# Patient Record
Sex: MALE | Race: ASIAN | NOT HISPANIC OR LATINO | URBAN - METROPOLITAN AREA
[De-identification: names, ages, dates, MRNs, and addresses within clinical notes are randomized per-mention and may not be internally consistent; named-entity substitution may affect disease eponyms.]

---

## 2018-03-19 ENCOUNTER — EMERGENCY (EMERGENCY)
Facility: HOSPITAL | Age: 48
LOS: 1 days | Discharge: HOME | End: 2018-03-19
Attending: EMERGENCY MEDICINE | Admitting: EMERGENCY MEDICINE
Payer: COMMERCIAL

## 2018-03-19 VITALS
OXYGEN SATURATION: 97 % | SYSTOLIC BLOOD PRESSURE: 125 MMHG | HEART RATE: 85 BPM | TEMPERATURE: 97 F | RESPIRATION RATE: 16 BRPM | DIASTOLIC BLOOD PRESSURE: 81 MMHG

## 2018-03-19 VITALS
WEIGHT: 225.09 LBS | HEIGHT: 69 IN | HEART RATE: 85 BPM | TEMPERATURE: 98 F | OXYGEN SATURATION: 96 % | DIASTOLIC BLOOD PRESSURE: 100 MMHG | SYSTOLIC BLOOD PRESSURE: 147 MMHG | RESPIRATION RATE: 18 BRPM

## 2018-03-19 DIAGNOSIS — Z98.890 OTHER SPECIFIED POSTPROCEDURAL STATES: Chronic | ICD-10-CM

## 2018-03-19 LAB
ALBUMIN SERPL ELPH-MCNC: 3.7 G/DL — SIGNIFICANT CHANGE UP (ref 3.3–5)
ALP SERPL-CCNC: 58 U/L — SIGNIFICANT CHANGE UP (ref 30–120)
ALT FLD-CCNC: 60 U/L DA — SIGNIFICANT CHANGE UP (ref 10–60)
ANION GAP SERPL CALC-SCNC: 10 MMOL/L — SIGNIFICANT CHANGE UP (ref 5–17)
APPEARANCE UR: CLEAR — SIGNIFICANT CHANGE UP
APTT BLD: 28.9 SEC — SIGNIFICANT CHANGE UP (ref 27.5–37.4)
AST SERPL-CCNC: 31 U/L — SIGNIFICANT CHANGE UP (ref 10–40)
BASOPHILS # BLD AUTO: 0.1 K/UL — SIGNIFICANT CHANGE UP (ref 0–0.2)
BASOPHILS NFR BLD AUTO: 1.4 % — SIGNIFICANT CHANGE UP (ref 0–2)
BILIRUB SERPL-MCNC: 0.3 MG/DL — SIGNIFICANT CHANGE UP (ref 0.2–1.2)
BILIRUB UR-MCNC: NEGATIVE — SIGNIFICANT CHANGE UP
BUN SERPL-MCNC: 15 MG/DL — SIGNIFICANT CHANGE UP (ref 7–23)
CALCIUM SERPL-MCNC: 9.2 MG/DL — SIGNIFICANT CHANGE UP (ref 8.4–10.5)
CHLORIDE SERPL-SCNC: 105 MMOL/L — SIGNIFICANT CHANGE UP (ref 96–108)
CO2 SERPL-SCNC: 27 MMOL/L — SIGNIFICANT CHANGE UP (ref 22–31)
COLOR SPEC: YELLOW — SIGNIFICANT CHANGE UP
CREAT SERPL-MCNC: 0.99 MG/DL — SIGNIFICANT CHANGE UP (ref 0.5–1.3)
DIFF PNL FLD: NEGATIVE — SIGNIFICANT CHANGE UP
EOSINOPHIL # BLD AUTO: 0.2 K/UL — SIGNIFICANT CHANGE UP (ref 0–0.5)
EOSINOPHIL NFR BLD AUTO: 2.3 % — SIGNIFICANT CHANGE UP (ref 0–6)
GLUCOSE SERPL-MCNC: 102 MG/DL — HIGH (ref 70–99)
GLUCOSE UR QL: NEGATIVE MG/DL — SIGNIFICANT CHANGE UP
HCT VFR BLD CALC: 45.3 % — SIGNIFICANT CHANGE UP (ref 39–50)
HGB BLD-MCNC: 14.7 G/DL — SIGNIFICANT CHANGE UP (ref 13–17)
INR BLD: 1.08 RATIO — SIGNIFICANT CHANGE UP (ref 0.88–1.16)
KETONES UR-MCNC: NEGATIVE — SIGNIFICANT CHANGE UP
LEUKOCYTE ESTERASE UR-ACNC: NEGATIVE — SIGNIFICANT CHANGE UP
LYMPHOCYTES # BLD AUTO: 2.9 K/UL — SIGNIFICANT CHANGE UP (ref 1–3.3)
LYMPHOCYTES # BLD AUTO: 29.7 % — SIGNIFICANT CHANGE UP (ref 13–44)
MCHC RBC-ENTMCNC: 27.9 PG — SIGNIFICANT CHANGE UP (ref 27–34)
MCHC RBC-ENTMCNC: 32.4 GM/DL — SIGNIFICANT CHANGE UP (ref 32–36)
MCV RBC AUTO: 86 FL — SIGNIFICANT CHANGE UP (ref 80–100)
MONOCYTES # BLD AUTO: 1 K/UL — HIGH (ref 0–0.9)
MONOCYTES NFR BLD AUTO: 10.6 % — SIGNIFICANT CHANGE UP (ref 2–14)
NEUTROPHILS # BLD AUTO: 5.4 K/UL — SIGNIFICANT CHANGE UP (ref 1.8–7.4)
NEUTROPHILS NFR BLD AUTO: 56 % — SIGNIFICANT CHANGE UP (ref 43–77)
NITRITE UR-MCNC: NEGATIVE — SIGNIFICANT CHANGE UP
PH UR: 6 — SIGNIFICANT CHANGE UP (ref 5–8)
PLATELET # BLD AUTO: 267 K/UL — SIGNIFICANT CHANGE UP (ref 150–400)
POTASSIUM SERPL-MCNC: 4.1 MMOL/L — SIGNIFICANT CHANGE UP (ref 3.5–5.3)
POTASSIUM SERPL-SCNC: 4.1 MMOL/L — SIGNIFICANT CHANGE UP (ref 3.5–5.3)
PROT SERPL-MCNC: 7.4 G/DL — SIGNIFICANT CHANGE UP (ref 6–8.3)
PROT UR-MCNC: NEGATIVE MG/DL — SIGNIFICANT CHANGE UP
PROTHROM AB SERPL-ACNC: 11.8 SEC — SIGNIFICANT CHANGE UP (ref 9.8–12.7)
RBC # BLD: 5.27 M/UL — SIGNIFICANT CHANGE UP (ref 4.2–5.8)
RBC # FLD: 12.8 % — SIGNIFICANT CHANGE UP (ref 10.3–14.5)
SODIUM SERPL-SCNC: 142 MMOL/L — SIGNIFICANT CHANGE UP (ref 135–145)
SP GR SPEC: 1.02 — SIGNIFICANT CHANGE UP (ref 1.01–1.02)
UROBILINOGEN FLD QL: NEGATIVE MG/DL — SIGNIFICANT CHANGE UP
WBC # BLD: 9.6 K/UL — SIGNIFICANT CHANGE UP (ref 3.8–10.5)
WBC # FLD AUTO: 9.6 K/UL — SIGNIFICANT CHANGE UP (ref 3.8–10.5)

## 2018-03-19 PROCEDURE — 81003 URINALYSIS AUTO W/O SCOPE: CPT

## 2018-03-19 PROCEDURE — 74176 CT ABD & PELVIS W/O CONTRAST: CPT | Mod: 26

## 2018-03-19 PROCEDURE — 80053 COMPREHEN METABOLIC PANEL: CPT

## 2018-03-19 PROCEDURE — 99284 EMERGENCY DEPT VISIT MOD MDM: CPT | Mod: 25

## 2018-03-19 PROCEDURE — 74176 CT ABD & PELVIS W/O CONTRAST: CPT

## 2018-03-19 PROCEDURE — 85610 PROTHROMBIN TIME: CPT

## 2018-03-19 PROCEDURE — 85730 THROMBOPLASTIN TIME PARTIAL: CPT

## 2018-03-19 PROCEDURE — 99284 EMERGENCY DEPT VISIT MOD MDM: CPT

## 2018-03-19 PROCEDURE — 85027 COMPLETE CBC AUTOMATED: CPT

## 2018-03-19 PROCEDURE — 96374 THER/PROPH/DIAG INJ IV PUSH: CPT

## 2018-03-19 PROCEDURE — 36415 COLL VENOUS BLD VENIPUNCTURE: CPT

## 2018-03-19 RX ORDER — KETOROLAC TROMETHAMINE 30 MG/ML
30 SYRINGE (ML) INJECTION ONCE
Qty: 0 | Refills: 0 | Status: DISCONTINUED | OUTPATIENT
Start: 2018-03-19 | End: 2018-03-19

## 2018-03-19 RX ORDER — SODIUM CHLORIDE 9 MG/ML
1000 INJECTION INTRAMUSCULAR; INTRAVENOUS; SUBCUTANEOUS ONCE
Qty: 0 | Refills: 0 | Status: COMPLETED | OUTPATIENT
Start: 2018-03-19 | End: 2018-03-19

## 2018-03-19 RX ORDER — TAMSULOSIN HYDROCHLORIDE 0.4 MG/1
0.4 CAPSULE ORAL ONCE
Qty: 0 | Refills: 0 | Status: COMPLETED | OUTPATIENT
Start: 2018-03-19 | End: 2018-03-19

## 2018-03-19 RX ADMIN — Medication 30 MILLIGRAM(S): at 04:34

## 2018-03-19 RX ADMIN — Medication 30 MILLIGRAM(S): at 04:04

## 2018-03-19 RX ADMIN — TAMSULOSIN HYDROCHLORIDE 0.4 MILLIGRAM(S): 0.4 CAPSULE ORAL at 04:34

## 2018-03-19 RX ADMIN — SODIUM CHLORIDE 1000 MILLILITER(S): 9 INJECTION INTRAMUSCULAR; INTRAVENOUS; SUBCUTANEOUS at 04:04

## 2018-03-19 NOTE — ED PROVIDER NOTE - OBJECTIVE STATEMENT
Patient c/o right back/flank pain. Started as a discomfort yesterday, became sever last evening. No n/v/d/c. No fever. Has been urinating frequently with only small amounts passing. Has a history of lithotripsy, but did not have pain with that stone.

## 2018-03-19 NOTE — ED ADULT NURSE NOTE - OBJECTIVE STATEMENT
pt. presents with acute onset rt posterior flank pain and dysuria, since approx. 2 hrs. pta at ed, hx of same in past with dx of renal calculi, denies other complaint at this time

## 2018-03-19 NOTE — ED ADULT NURSE NOTE - CHPI ED SYMPTOMS NEG
no vomiting/no dizziness/no weakness/no nausea/no numbness/no fever/no chills/no decreased eating/drinking

## 2018-03-19 NOTE — ED PROVIDER NOTE - CHPI ED SYMPTOMS NEG
no burning urination/no dysuria/no hematuria/no diarrhea/no vomiting/no blood in stool/no chills/no abdominal distension/no fever

## 2018-08-12 ENCOUNTER — TRANSCRIPTION ENCOUNTER (OUTPATIENT)
Age: 48
End: 2018-08-12

## 2020-01-17 ENCOUNTER — INPATIENT (INPATIENT)
Facility: HOSPITAL | Age: 50
LOS: 2 days | Discharge: HOME | End: 2020-01-20
Attending: INTERNAL MEDICINE | Admitting: INTERNAL MEDICINE
Payer: COMMERCIAL

## 2020-01-17 VITALS
TEMPERATURE: 98 F | SYSTOLIC BLOOD PRESSURE: 146 MMHG | HEART RATE: 88 BPM | OXYGEN SATURATION: 100 % | RESPIRATION RATE: 18 BRPM | DIASTOLIC BLOOD PRESSURE: 83 MMHG

## 2020-01-17 DIAGNOSIS — Z98.890 OTHER SPECIFIED POSTPROCEDURAL STATES: Chronic | ICD-10-CM

## 2020-01-17 NOTE — ED ADULT TRIAGE NOTE - CHIEF COMPLAINT QUOTE
Pt states while driving about 20 minutes ago he suddenly started feeling pain and weakness in L arm followed by chest pain. Pt states said symptoms lasted about 10 minutes and have since resolved.  Pt denies nausea/vomiting/SOB.

## 2020-01-18 DIAGNOSIS — F32.9 MAJOR DEPRESSIVE DISORDER, SINGLE EPISODE, UNSPECIFIED: ICD-10-CM

## 2020-01-18 DIAGNOSIS — I20.9 ANGINA PECTORIS, UNSPECIFIED: ICD-10-CM

## 2020-01-18 DIAGNOSIS — R07.9 CHEST PAIN, UNSPECIFIED: ICD-10-CM

## 2020-01-18 DIAGNOSIS — I10 ESSENTIAL (PRIMARY) HYPERTENSION: ICD-10-CM

## 2020-01-18 PROBLEM — N20.0 CALCULUS OF KIDNEY: Chronic | Status: ACTIVE | Noted: 2018-03-19

## 2020-01-18 LAB
ALBUMIN SERPL ELPH-MCNC: 4.1 G/DL — SIGNIFICANT CHANGE UP (ref 3.3–5)
ALP SERPL-CCNC: 68 U/L — SIGNIFICANT CHANGE UP (ref 40–120)
ALT FLD-CCNC: 69 U/L — HIGH (ref 4–41)
ANION GAP SERPL CALC-SCNC: 11 MMO/L — SIGNIFICANT CHANGE UP (ref 7–14)
APTT BLD: 27.6 SEC — SIGNIFICANT CHANGE UP (ref 27.5–36.3)
AST SERPL-CCNC: 40 U/L — SIGNIFICANT CHANGE UP (ref 4–40)
BASOPHILS # BLD AUTO: 0.06 K/UL — SIGNIFICANT CHANGE UP (ref 0–0.2)
BASOPHILS NFR BLD AUTO: 0.8 % — SIGNIFICANT CHANGE UP (ref 0–2)
BILIRUB SERPL-MCNC: 0.2 MG/DL — SIGNIFICANT CHANGE UP (ref 0.2–1.2)
BUN SERPL-MCNC: 13 MG/DL — SIGNIFICANT CHANGE UP (ref 7–23)
CALCIUM SERPL-MCNC: 9.3 MG/DL — SIGNIFICANT CHANGE UP (ref 8.4–10.5)
CHLORIDE SERPL-SCNC: 106 MMOL/L — SIGNIFICANT CHANGE UP (ref 98–107)
CO2 SERPL-SCNC: 25 MMOL/L — SIGNIFICANT CHANGE UP (ref 22–31)
CREAT SERPL-MCNC: 0.77 MG/DL — SIGNIFICANT CHANGE UP (ref 0.5–1.3)
EOSINOPHIL # BLD AUTO: 0.2 K/UL — SIGNIFICANT CHANGE UP (ref 0–0.5)
EOSINOPHIL NFR BLD AUTO: 2.6 % — SIGNIFICANT CHANGE UP (ref 0–6)
GLUCOSE SERPL-MCNC: 110 MG/DL — HIGH (ref 70–99)
HCT VFR BLD CALC: 46.3 % — SIGNIFICANT CHANGE UP (ref 39–50)
HGB BLD-MCNC: 14.1 G/DL — SIGNIFICANT CHANGE UP (ref 13–17)
IMM GRANULOCYTES NFR BLD AUTO: 0.9 % — SIGNIFICANT CHANGE UP (ref 0–1.5)
INR BLD: 1.18 — HIGH (ref 0.88–1.17)
LIDOCAIN IGE QN: 52.8 U/L — SIGNIFICANT CHANGE UP (ref 7–60)
LYMPHOCYTES # BLD AUTO: 1.93 K/UL — SIGNIFICANT CHANGE UP (ref 1–3.3)
LYMPHOCYTES # BLD AUTO: 24.8 % — SIGNIFICANT CHANGE UP (ref 13–44)
MCHC RBC-ENTMCNC: 27 PG — SIGNIFICANT CHANGE UP (ref 27–34)
MCHC RBC-ENTMCNC: 30.5 % — LOW (ref 32–36)
MCV RBC AUTO: 88.5 FL — SIGNIFICANT CHANGE UP (ref 80–100)
MONOCYTES # BLD AUTO: 0.88 K/UL — SIGNIFICANT CHANGE UP (ref 0–0.9)
MONOCYTES NFR BLD AUTO: 11.3 % — SIGNIFICANT CHANGE UP (ref 2–14)
NEUTROPHILS # BLD AUTO: 4.64 K/UL — SIGNIFICANT CHANGE UP (ref 1.8–7.4)
NEUTROPHILS NFR BLD AUTO: 59.6 % — SIGNIFICANT CHANGE UP (ref 43–77)
NRBC # FLD: 0 K/UL — SIGNIFICANT CHANGE UP (ref 0–0)
PLATELET # BLD AUTO: 266 K/UL — SIGNIFICANT CHANGE UP (ref 150–400)
PMV BLD: 10.9 FL — SIGNIFICANT CHANGE UP (ref 7–13)
POTASSIUM SERPL-MCNC: 3.9 MMOL/L — SIGNIFICANT CHANGE UP (ref 3.5–5.3)
POTASSIUM SERPL-SCNC: 3.9 MMOL/L — SIGNIFICANT CHANGE UP (ref 3.5–5.3)
PROT SERPL-MCNC: 7 G/DL — SIGNIFICANT CHANGE UP (ref 6–8.3)
PROTHROM AB SERPL-ACNC: 13.2 SEC — HIGH (ref 9.8–13.1)
RBC # BLD: 5.23 M/UL — SIGNIFICANT CHANGE UP (ref 4.2–5.8)
RBC # FLD: 14.1 % — SIGNIFICANT CHANGE UP (ref 10.3–14.5)
SODIUM SERPL-SCNC: 142 MMOL/L — SIGNIFICANT CHANGE UP (ref 135–145)
TROPONIN T, HIGH SENSITIVITY: < 6 NG/L — SIGNIFICANT CHANGE UP (ref ?–14)
TROPONIN T, HIGH SENSITIVITY: < 6 NG/L — SIGNIFICANT CHANGE UP (ref ?–14)
WBC # BLD: 7.78 K/UL — SIGNIFICANT CHANGE UP (ref 3.8–10.5)
WBC # FLD AUTO: 7.78 K/UL — SIGNIFICANT CHANGE UP (ref 3.8–10.5)

## 2020-01-18 PROCEDURE — 99223 1ST HOSP IP/OBS HIGH 75: CPT

## 2020-01-18 PROCEDURE — 70551 MRI BRAIN STEM W/O DYE: CPT | Mod: 26

## 2020-01-18 PROCEDURE — 71046 X-RAY EXAM CHEST 2 VIEWS: CPT | Mod: 26

## 2020-01-18 RX ORDER — CLOPIDOGREL BISULFATE 75 MG/1
75 TABLET, FILM COATED ORAL DAILY
Refills: 0 | Status: DISCONTINUED | OUTPATIENT
Start: 2020-01-18 | End: 2020-01-20

## 2020-01-18 RX ORDER — DULOXETINE HYDROCHLORIDE 30 MG/1
30 CAPSULE, DELAYED RELEASE ORAL ONCE
Refills: 0 | Status: COMPLETED | OUTPATIENT
Start: 2020-01-18 | End: 2020-01-18

## 2020-01-18 RX ORDER — ATORVASTATIN CALCIUM 80 MG/1
80 TABLET, FILM COATED ORAL AT BEDTIME
Refills: 0 | Status: DISCONTINUED | OUTPATIENT
Start: 2020-01-18 | End: 2020-01-20

## 2020-01-18 RX ORDER — ACETAMINOPHEN 500 MG
650 TABLET ORAL EVERY 6 HOURS
Refills: 0 | Status: DISCONTINUED | OUTPATIENT
Start: 2020-01-18 | End: 2020-01-20

## 2020-01-18 RX ORDER — AMLODIPINE BESYLATE 2.5 MG/1
1 TABLET ORAL
Qty: 0 | Refills: 0 | DISCHARGE

## 2020-01-18 RX ORDER — ASPIRIN/CALCIUM CARB/MAGNESIUM 324 MG
81 TABLET ORAL DAILY
Refills: 0 | Status: DISCONTINUED | OUTPATIENT
Start: 2020-01-18 | End: 2020-01-20

## 2020-01-18 RX ORDER — ASPIRIN/CALCIUM CARB/MAGNESIUM 324 MG
325 TABLET ORAL ONCE
Refills: 0 | Status: COMPLETED | OUTPATIENT
Start: 2020-01-18 | End: 2020-01-18

## 2020-01-18 RX ORDER — INFLUENZA VIRUS VACCINE 15; 15; 15; 15 UG/.5ML; UG/.5ML; UG/.5ML; UG/.5ML
0.5 SUSPENSION INTRAMUSCULAR ONCE
Refills: 0 | Status: DISCONTINUED | OUTPATIENT
Start: 2020-01-18 | End: 2020-01-20

## 2020-01-18 RX ORDER — AMLODIPINE BESYLATE 2.5 MG/1
10 TABLET ORAL DAILY
Refills: 0 | Status: DISCONTINUED | OUTPATIENT
Start: 2020-01-18 | End: 2020-01-19

## 2020-01-18 RX ADMIN — Medication 650 MILLIGRAM(S): at 16:18

## 2020-01-18 RX ADMIN — Medication 650 MILLIGRAM(S): at 15:48

## 2020-01-18 RX ADMIN — CLOPIDOGREL BISULFATE 75 MILLIGRAM(S): 75 TABLET, FILM COATED ORAL at 22:49

## 2020-01-18 RX ADMIN — Medication 325 MILLIGRAM(S): at 01:17

## 2020-01-18 RX ADMIN — AMLODIPINE BESYLATE 10 MILLIGRAM(S): 2.5 TABLET ORAL at 05:04

## 2020-01-18 RX ADMIN — ATORVASTATIN CALCIUM 80 MILLIGRAM(S): 80 TABLET, FILM COATED ORAL at 22:49

## 2020-01-18 RX ADMIN — DULOXETINE HYDROCHLORIDE 30 MILLIGRAM(S): 30 CAPSULE, DELAYED RELEASE ORAL at 22:49

## 2020-01-18 NOTE — H&P ADULT - NSHPREVIEWOFSYSTEMS_GEN_ALL_CORE
Constitutional Symptoms: No weakness, fevers, chills, weight loss  Eyes: No visual changes, headache, eye pain, double vision  Ears, Nose, Mouth, Throat: No runny nose, sinus pain, ear pain, tinnitus, sore throat, dysphagia, odynophagia  Cardiovascular: +chest pain, no palpitations, edema  Respiratory: No cough, wheezing, hemoptysis, shortness of breath  Gastrointestinal: No abdominal pain, dysphagia, anorexia, nausea/vomiting, diarrhea/constipation, hematemesis, BRBPR, melena  Genitourinary: No dysuria, frequency, hematuria  Musculoskeletal: No joint pain, joint swelling, decreased ROM  Skin: No pruritus, rashes, lesions, wounds  Neurologic:  +LUE numbness/weakness, no seizures, headache    Positives and pertinent negatives noted and all other systems negative.

## 2020-01-18 NOTE — ED PROVIDER NOTE - OBJECTIVE STATEMENT
49M hx htn, hld p/w cp. Patient reports that around 9PM he was driving and he developed L arm weakness/numbness a/w L sided chest "discomfort." Reports as mild, lasting a few minutes, a/w mild lightheadedness, sob, nausea. Reports he is recovering from a "cold-like" illness that started 2 weeks ago but that those symptoms are much improved. Denies fevers, leg swelling, recent travel. Last stress test was 1.5 years ago.

## 2020-01-18 NOTE — H&P ADULT - NSHPPHYSICALEXAM_GEN_ALL_CORE
T(C): 36.6 (01-18-20 @ 03:03), Max: 36.8 (01-17-20 @ 22:25)  HR: 73 (01-18-20 @ 03:03) (73 - 88)  BP: 125/88 (01-18-20 @ 03:03) (125/88 - 146/83)  RR: 18 (01-18-20 @ 03:03) (16 - 18)  SpO2: 100% (01-18-20 @ 03:03) (98% - 100%)    Constitutional: NAD, obese  Ears, Nose, Mouth, and Throat: normal external ears and nose, normal hearing, moist oral mucosa  Eyes: normal conjunctiva, EOMI, PERRL  Neck: supple, no JVD  Respiratory: Clear to auscultation bilaterally. No wheezes, rales or rhonchi. Normal respiratory effort  Cardiovascular: RRR, no M/R/G, no edema, 2+ Peripheral Pulses  Gastrointestinal: soft, nontender, nondistended, +BS, no hernia  Skin: warm, dry, no rash  Neurologic: sensation grossly intact, CN grossly intact, non-focal exam  Musculoskeletal: no clubbing, no cyanosis, no joint swelling  Psychiatric: AOX3, appropriate mood, affect

## 2020-01-18 NOTE — CONSULT NOTE ADULT - ATTENDING COMMENTS
Patient is a 48 y/o M PMH HTN, HLD, depression p/w LUE weakness/numbness      full work up for stroke     MRI  CTA    aspirin and plavix

## 2020-01-18 NOTE — ED PROVIDER NOTE - CLINICAL SUMMARY MEDICAL DECISION MAKING FREE TEXT BOX
Impression:  CP in adult with mutlipl cardiac RFs that is concerning for ACS.  There are no H&P features of a more serious etiology such as pericarditis, myocarditis, pulmonary embolism,  pneumothorax, pneumonia, zoster, or esophageal perforation.  History not abrupt in onset, tearing or ripping; pulses symmetric; no evidence of aortic dissection.    Plan:  trop, ECG, basic labs, cxr.  There are no CDU beds, and his description of his CP syndrome as well as his cardiac RFs may merit cath over stress.

## 2020-01-18 NOTE — ED PROVIDER NOTE - ATTENDING CONTRIBUTION TO CARE
MD Juan:  patient seen and evaluated with the resident.  I was present for key portions of the History & Physical, and I agree with the Impression & Plan.  MD Juan:  48 yo M, c/o SSCP radiating to the L arm.  Onset:  10pm.  Duration 15min.  Quality:  pressure like.  Associated Sx:  BREWER over the last 3d.  Cardiac RFs:  age, +HTN, +obesity, +HLD, +family Hx (father  in 50s of AMI).    VS: wnl.  Physical Exam: adult M, obese, NAD, NCAT, PERRL, EOMI, neck supple, CTA B, RRR, Abd: s/nd/nt, Ext: no edema.  Neuro:  AAOx3, moving all 4 extremities equally, normal gait.     ECG:  no FIDELIA/dep, no TWA  Impression:  CP in adult with mutlipl cardiac RFs that is concerning for ACS.  There are no H&P features of a more serious etiology such as pericarditis, myocarditis, pulmonary embolism,  pneumothorax, pneumonia, zoster, or esophageal perforation.  History not abrupt in onset, tearing or ripping; pulses symmetric; no evidence of aortic dissection.    Plan:  trop, ECG, basic labs, cxr.  There are no CDU beds, and his description of his CP syndrome as well as his cardiac RFs may merit cath over stress.

## 2020-01-18 NOTE — H&P ADULT - PROBLEM SELECTOR PLAN 1
Pt w/ +FH, obesity, HTN, HLD, not on ASA. Cardiac enzymes neg X2, monitor on telemetry, serial EKGs, trend cardiac enzymes, TTE, cards in AM

## 2020-01-18 NOTE — CONSULT NOTE ADULT - ASSESSMENT
ekg - nsr     a/p     1) Left hand weakness/numbness - will get neurology consult , f/u CT head , symptoms resolved    2) Chest pains - EKG and troponins negative f/u echo , will do stress test after cleared by neuro    3) HTN - cont amlodipine

## 2020-01-18 NOTE — H&P ADULT - HISTORY OF PRESENT ILLNESS
ED-3B (ESSU-1): Wale Mason  49 M PMH HTN, HLD p/w LUE weakness/numbness followed by L sided chest discomfort last night ~21:00 w/ associated lightheadedness, SOB, and nausea. Symptoms lasted about 10 minutes and self-resolved. Patient reports recent URI about 2 weeks ago, overall improved.      Last stress test 1.5 years ago.      NKDA    Meds:      Father MI in 50s Patient is a 48 y/o M PMH HTN, HLD, depression p/w LUE weakness/numbness followed by mild L sided chest discomfort/cramping last night ~21:00 w/ associated lightheadedness, SOB, and nausea. Patient was driving at the time, symptoms lasted about 10 minutes and self-resolved. Patient reports recent URI about 2 weeks ago, overall improved. Has never experienced chest pain in the past, had a stress test 1.5 years ago which was normal. Had testing done due to his risk factors, did not have any symptoms at the time.

## 2020-01-18 NOTE — H&P ADULT - NSHPLABSRESULTS_GEN_ALL_CORE
01-18    142  |  106  |  13  ----------------------------<  110<H>  3.9   |  25  |  0.77    Ca    9.3      18 Jan 2020 00:25    TPro  7.0  /  Alb  4.1  /  TBili  0.2  /  DBili  x   /  AST  40  /  ALT  69<H>  /  AlkPhos  68  01-18                          14.1   7.78  )-----------( 266      ( 18 Jan 2020 00:35 )             46.3     LIVER FUNCTIONS - ( 18 Jan 2020 00:25 )  Alb: 4.1 g/dL / Pro: 7.0 g/dL / ALK PHOS: 68 u/L / ALT: 69 u/L / AST: 40 u/L / GGT: x           PT/INR - ( 18 Jan 2020 00:35 )   PT: 13.2 SEC;   INR: 1.18       PTT - ( 18 Jan 2020 00:35 )  PTT:27.6 SEC    EKG personally reviewed, NSR, no acute findings

## 2020-01-18 NOTE — H&P ADULT - NSICDXPASTMEDICALHX_GEN_ALL_CORE_FT
PAST MEDICAL HISTORY:  Essential hypertension     Hyperlipidemia     MDD (major depressive disorder)

## 2020-01-18 NOTE — ED ADULT NURSE NOTE - OBJECTIVE STATEMENT
Patient is a 49 year-old male A&OX3 with a PMH of HTN, says while he was driving 20 minutes before coming to the ED he was experiencing chest pain that radiated down the left arm. Patient says since then chest pain has sub-sided. Patient appears comfortable in bed at this time. NAD. Vital signs as noted. Patient's respirations are even and unlabored, chest rise equal b/l. 20 G placed in R AC. Labs drawn sent. Placed on cardiac monitor. MD at the bedside to evaluate. Patient to chest x-ray. will continue to monitor.

## 2020-01-18 NOTE — ED PROVIDER NOTE - MUSCULOSKELETAL, MLM
Spine appears normal, range of motion is not limited, no muscle or joint tenderness 5/5 strength in distal extremities b/l. no lower extremity swelling

## 2020-01-18 NOTE — CONSULT NOTE ADULT - SUBJECTIVE AND OBJECTIVE BOX
HPI:  Patient is a 48 y/o M PMH HTN, HLD, depression p/w LUE weakness/numbness followed by mild L sided chest discomfort/cramping last night ~21:00 w/ associated lightheadedness, SOB, and nausea. Patient was driving at the time, symptoms lasted about 10 minutes and self-resolved. Patient reports recent URI about 2 weeks ago, overall improved. Has never experienced chest pain in the past, had a stress test 1.5 years ago which was normal. Had testing done due to his risk factors, did not have any symptoms at the time. (18 Jan 2020 02:55)      REVIEW OF SYSTEMS  General:	  Skin/Breast:	  Ophthalmologic:  ENMT:	  Respiratory and Thorax:	  Cardiovascular:	  Gastrointestinal:	  Genitourinary:	  Musculoskeletal:	  Neurological:	  Psychiatric:	  Hematology/Lymphatics:	  Endocrine:	  Allergic/Immunologic:	    A 10-system ROS was performed and is negative except for those items noted above and/or in the HPI.    PAST MEDICAL & SURGICAL HISTORY:  MDD (major depressive disorder)  Hyperlipidemia  Essential hypertension  Kidney stone  HTN (hypertension)  No significant past surgical history  H/O lithotripsy  H/O knee surgery  History of appendectomy    FAMILY HISTORY:  FH: myocardial infarction: father, age 53    SOCIAL HISTORY:   T/E/D:   Occupation:   Lives with:     MEDICATIONS (HOME):  Home Medications:  amLODIPine 10 mg oral tablet: 1 tab(s) orally once a day (18 Jan 2020 11:46)    MEDICATIONS  (STANDING):  amLODIPine   Tablet 10 milliGRAM(s) Oral daily  influenza   Vaccine 0.5 milliLiter(s) IntraMuscular once    MEDICATIONS  (PRN):  acetaminophen   Tablet .. 650 milliGRAM(s) Oral every 6 hours PRN Temp greater or equal to 38C (100.4F), Mild Pain (1 - 3), Moderate Pain (4 - 6), Severe Pain (7 - 10)    ALLERGIES/INTOLERANCES:  Allergies  No Known Allergies    Intolerances    VITALS & EXAMINATION:  Vital Signs Last 24 Hrs  T(C): 36.6 (18 Jan 2020 05:02), Max: 36.8 (17 Jan 2020 22:25)  T(F): 97.8 (18 Jan 2020 05:02), Max: 98.3 (17 Jan 2020 22:25)  HR: 90 (18 Jan 2020 05:02) (73 - 90)  BP: 135/60 (18 Jan 2020 05:02) (125/88 - 146/83)  BP(mean): --  RR: 18 (18 Jan 2020 05:02) (16 - 18)  SpO2: 97% (18 Jan 2020 05:02) (97% - 100%)    Neurological Exam    Mental Status:  alert and oriented x3, fluent speech, following commands, repetition and naming intact    Cranial Nerves: PERRL, EOMI without nystagmus, visual fields intact, V 1-3 intact, no facial droop, pallate and uvula midline, no dysarthria, head turn strength normal, shoulder shrug strength normal.    Motor:   Tone:   normal                 Strength:    Upper Extremity    De L 5/5 R 5/5  Bi L 5/5 R 5/5  Tr L 5/5 R 5/5  Br L 5/5 R 5/5    Lower extremity    HF L 5/5 R 5/5  KE L 5/5 R 5/5  KF L 5/5 R 5/5  DF L 5/5 R 5/5  PF L 5/5 R 5/5    Pronator drift: none            Sensation: intact grossly to light touch    Tremor: none appreciated at rest or in action    Deep Tendon Reflexes: 2+ throughout except 1+ RLE     LABORATORY:  CBC                       14.1   7.78  )-----------( 266      ( 18 Jan 2020 00:35 )             46.3     Chem 01-18    142  |  106  |  13  ----------------------------<  110<H>  3.9   |  25  |  0.77    Ca    9.3      18 Jan 2020 00:25    TPro  7.0  /  Alb  4.1  /  TBili  0.2  /  DBili  x   /  AST  40  /  ALT  69<H>  /  AlkPhos  68  01-18    LFTs LIVER FUNCTIONS - ( 18 Jan 2020 00:25 )  Alb: 4.1 g/dL / Pro: 7.0 g/dL / ALK PHOS: 68 u/L / ALT: 69 u/L / AST: 40 u/L / GGT: x           Coagulopathy PT/INR - ( 18 Jan 2020 00:35 )   PT: 13.2 SEC;   INR: 1.18          PTT - ( 18 Jan 2020 00:35 )  PTT:27.6 SEC  Lipid Panel   A1c   Cardiac enzymes     U/A   CSF  Immunological  Other    STUDIES & IMAGING:  Studies (EKG, EEG, EMG, etc):     Radiology (XR, CT, MR, U/S, TTE/NORA):

## 2020-01-18 NOTE — CONSULT NOTE ADULT - ASSESSMENT
Patient is a 48 y/o M PMH HTN, HLD, depression p/w LUE weakness/numbness followed by mild L sided chest discomfort/cramping last night ~21:00 w/ associated lightheadedness, SOB, and nausea. Patient was driving at the time, symptoms lasted about 10 minutes and self-resolved. Patient reports recent URI about 2 weeks ago, overall improved. Has never experienced chest pain in the past, had a stress test 1.5 years ago which was normal. Had testing done due to his risk factors, did not have any symptoms at the time.  Neurology consulted for left hand numbness and headache.  Hand sxs resolved and headache tx successfully w caffeine.      Impression:  Transient LUE numbness and perceived weakness lasting 15 minutes iso obese male non compliant on HTN meds day prior to exam.  No neurological contraindication to planned stress test.    Not discussed with attending yet    [] HTN control  [] weight loss counseling and vascular risk factor education    Imaging   [] CT head  [] CTA H&N  [] MRI head non contrast    Meds - iso cardiac recs   [] start ASA 81 mg daily  [] start Plavix 75 mg daily  [] start atorva 80 mg daily, titrate based on lipid profile    Studies  [] TTE with bubble    Labs   [] CBC, BMP  [] Lipid panel  [] HbA1C    follow up within one week of discharge with:   Dr. Fady Morrison  [address] [place] [address] [phone] Patient is a 48 y/o M PMH HTN, HLD, depression p/w LUE weakness/numbness followed by mild L sided chest discomfort/cramping last night ~21:00 w/ associated lightheadedness, SOB, and nausea. Patient was driving at the time, symptoms lasted about 10 minutes and self-resolved. Patient reports recent URI about 2 weeks ago, overall improved. Has never experienced chest pain in the past, had a stress test 1.5 years ago which was normal. Had testing done due to his risk factors, did not have any symptoms at the time.  Neurology consulted for left hand numbness and headache.  Hand sxs resolved and headache tx successfully w caffeine.      Impression:  Transient LUE numbness and perceived weakness lasting 15 minutes prior to exam.  Can't rule out TIA.    [] HTN control  [] weight loss counseling and vascular risk factor education    Imaging   [] CT head  [] CTA H&N  [] MRI head non contrast    Meds - iso cardiac recs   [] start ASA 81 mg daily  [] start Plavix 75 mg daily  [] start atorva 80 mg daily, titrate based on lipid profile    Studies  [] TTE with bubble    Labs   [] CBC, BMP  [] Lipid panel  [] HbA1C    follow up within one week of discharge with:   Dr. Fady Morrison  [address] [place] [address] [phone]

## 2020-01-18 NOTE — CONSULT NOTE ADULT - SUBJECTIVE AND OBJECTIVE BOX
Sathish Gomez MD  Interventional Cardiology / Advance Heart Failure and Cardiac Transplant Specialist  [place] Office : [phone] [place], [place] [address]  Tel:   [place] Office : [address] [facility] [place] [address]  Tel: [phone]  Cell : [phone]      HISTORY OF PRESENTING ILLNESS:  HPI:  Patient is a 48 y/o M PMH HTN, HLD, depression p/w LUE weakness/numbness followed by mild L sided chest discomfort/cramping last night ~21:00 w/ associated lightheadedness, SOB, and nausea. Patient was driving at the time, symptoms lasted about 10 minutes and self-resolved. Patient reports recent URI about 2 weeks ago, overall improved. Has never experienced chest pain in the past, had a stress test 1.5 years ago which was normal. Had testing done due to his risk factors, did not have any symptoms at the time.    PAST MEDICAL & SURGICAL HISTORY:  MDD (major depressive disorder)  Hyperlipidemia  Essential hypertension  Kidney stone  HTN (hypertension)  No significant past surgical history  H/O lithotripsy  H/O knee surgery  History of appendectomy      SOCIAL HISTORY: Substance Use (street drugs): ( x ) never used  (  ) other:    FAMILY HISTORY:  FH: myocardial infarction: father, age 53      REVIEW OF SYSTEMS:  CONSTITUTIONAL: No fever, weight loss, or fatigue  EYES: No eye pain, visual disturbances, or discharge  ENMT:  No difficulty hearing, tinnitus, vertigo; No sinus or throat pain  BREASTS: No pain, masses, or nipple discharge  GASTROINTESTINAL: No abdominal or epigastric pain. No nausea, vomiting, or hematemesis; No diarrhea or constipation. No melena or hematochezia.  GENITOURINARY: No dysuria, frequency, hematuria, or incontinence  NEUROLOGICAL: No headaches, memory loss, loss of strength, numbness, or tremors  ENDOCRINE: No heat or cold intolerance; No hair loss  MUSCULOSKELETAL: No joint pain or swelling; No muscle, back, or extremity pain  PSYCHIATRIC: No depression, anxiety, mood swings, or difficulty sleeping  HEME/LYMPH: No easy bruising, or bleeding gums  All others negative    MEDICATIONS:  amLODIPine   Tablet 10 milliGRAM(s) Oral daily        acetaminophen   Tablet .. 650 milliGRAM(s) Oral every 6 hours PRN        influenza   Vaccine 0.5 milliLiter(s) IntraMuscular once      FAMILY HISTORY:  FH: myocardial infarction: father, age 53        Allergies    No Known Allergies    Intolerances    	      PHYSICAL EXAM:  T(C): 36.6 (01-18-20 @ 05:02), Max: 36.8 (01-17-20 @ 22:25)  HR: 90 (01-18-20 @ 05:02) (73 - 90)  BP: 135/60 (01-18-20 @ 05:02) (125/88 - 146/83)  RR: 18 (01-18-20 @ 05:02) (16 - 18)  SpO2: 97% (01-18-20 @ 05:02) (97% - 100%)  Wt(kg): --  I&O's Summary      GENERAL: NAD   EYES: EOMI, PERRLA, conjunctiva and sclera clear  ENMT: No tonsillar erythema, exudates, or enlargement; Moist mucous membranes, Good dentition, No lesions  Cardiovascular: Normal S1 S2, No JVD, No murmurs, No edema  Respiratory: Lungs clear to auscultation	  Gastrointestinal:  Soft, Non-tender, + BS	  Extremities: Normal range of motion, No clubbing, cyanosis or edema  LYMPH: No lymphadenopathy noted  NERVOUS SYSTEM:  Alert & Oriented X3, Good concentration; Motor Strength 5/5 B/L upper and lower extremities; DTRs 2+ intact and symmetric      LABS:	 	    CARDIAC MARKERS:                                  14.1   7.78  )-----------( 266      ( 18 Jan 2020 00:35 )             46.3     01-18    142  |  106  |  13  ----------------------------<  110<H>  3.9   |  25  |  0.77    Ca    9.3      18 Jan 2020 00:25    TPro  7.0  /  Alb  4.1  /  TBili  0.2  /  DBili  x   /  AST  40  /  ALT  69<H>  /  AlkPhos  68  01-18    proBNP:   Lipid Profile:   HgA1c:   TSH:     Consultant(s) Notes Reviewed:  [x ] YES  [ ] NO    Care Discussed with Consultants/Other Providers [ x] YES  [ ] NO    Imaging Personally Reviewed independently:  [x] YES  [ ] NO    All labs, radiologic studies, vitals, orders and medications list reviewed. Patient is seen and examined at bedside. Case discussed with medical team.    ASSESSMENT/PLAN:

## 2020-01-19 ENCOUNTER — TRANSCRIPTION ENCOUNTER (OUTPATIENT)
Age: 50
End: 2020-01-19

## 2020-01-19 LAB
ANION GAP SERPL CALC-SCNC: 9 MMO/L — SIGNIFICANT CHANGE UP (ref 7–14)
BUN SERPL-MCNC: 12 MG/DL — SIGNIFICANT CHANGE UP (ref 7–23)
CALCIUM SERPL-MCNC: 9.1 MG/DL — SIGNIFICANT CHANGE UP (ref 8.4–10.5)
CHLORIDE SERPL-SCNC: 103 MMOL/L — SIGNIFICANT CHANGE UP (ref 98–107)
CHOLEST SERPL-MCNC: 194 MG/DL — SIGNIFICANT CHANGE UP (ref 120–199)
CO2 SERPL-SCNC: 24 MMOL/L — SIGNIFICANT CHANGE UP (ref 22–31)
CREAT SERPL-MCNC: 0.66 MG/DL — SIGNIFICANT CHANGE UP (ref 0.5–1.3)
GLUCOSE SERPL-MCNC: 116 MG/DL — HIGH (ref 70–99)
HBA1C BLD-MCNC: 6.4 % — HIGH (ref 4–5.6)
HCT VFR BLD CALC: 46.2 % — SIGNIFICANT CHANGE UP (ref 39–50)
HDLC SERPL-MCNC: 45 MG/DL — SIGNIFICANT CHANGE UP (ref 35–55)
HGB BLD-MCNC: 14.6 G/DL — SIGNIFICANT CHANGE UP (ref 13–17)
LIPID PNL WITH DIRECT LDL SERPL: 133 MG/DL — SIGNIFICANT CHANGE UP
MAGNESIUM SERPL-MCNC: 1.9 MG/DL — SIGNIFICANT CHANGE UP (ref 1.6–2.6)
MCHC RBC-ENTMCNC: 27.2 PG — SIGNIFICANT CHANGE UP (ref 27–34)
MCHC RBC-ENTMCNC: 31.6 % — LOW (ref 32–36)
MCV RBC AUTO: 86.2 FL — SIGNIFICANT CHANGE UP (ref 80–100)
NRBC # FLD: 0 K/UL — SIGNIFICANT CHANGE UP (ref 0–0)
PHOSPHATE SERPL-MCNC: 3.5 MG/DL — SIGNIFICANT CHANGE UP (ref 2.5–4.5)
PLATELET # BLD AUTO: 253 K/UL — SIGNIFICANT CHANGE UP (ref 150–400)
PMV BLD: 10.3 FL — SIGNIFICANT CHANGE UP (ref 7–13)
POTASSIUM SERPL-MCNC: 3.8 MMOL/L — SIGNIFICANT CHANGE UP (ref 3.5–5.3)
POTASSIUM SERPL-SCNC: 3.8 MMOL/L — SIGNIFICANT CHANGE UP (ref 3.5–5.3)
RBC # BLD: 5.36 M/UL — SIGNIFICANT CHANGE UP (ref 4.2–5.8)
RBC # FLD: 13.7 % — SIGNIFICANT CHANGE UP (ref 10.3–14.5)
SODIUM SERPL-SCNC: 136 MMOL/L — SIGNIFICANT CHANGE UP (ref 135–145)
TRIGL SERPL-MCNC: 117 MG/DL — SIGNIFICANT CHANGE UP (ref 10–149)
WBC # BLD: 7.07 K/UL — SIGNIFICANT CHANGE UP (ref 3.8–10.5)
WBC # FLD AUTO: 7.07 K/UL — SIGNIFICANT CHANGE UP (ref 3.8–10.5)

## 2020-01-19 PROCEDURE — 70498 CT ANGIOGRAPHY NECK: CPT | Mod: 26

## 2020-01-19 PROCEDURE — 70496 CT ANGIOGRAPHY HEAD: CPT | Mod: 26

## 2020-01-19 RX ORDER — AMLODIPINE BESYLATE 2.5 MG/1
5 TABLET ORAL DAILY
Refills: 0 | Status: DISCONTINUED | OUTPATIENT
Start: 2020-01-19 | End: 2020-01-20

## 2020-01-19 RX ADMIN — AMLODIPINE BESYLATE 5 MILLIGRAM(S): 2.5 TABLET ORAL at 14:56

## 2020-01-19 RX ADMIN — CLOPIDOGREL BISULFATE 75 MILLIGRAM(S): 75 TABLET, FILM COATED ORAL at 12:13

## 2020-01-19 RX ADMIN — ATORVASTATIN CALCIUM 80 MILLIGRAM(S): 80 TABLET, FILM COATED ORAL at 22:33

## 2020-01-19 RX ADMIN — Medication 81 MILLIGRAM(S): at 12:13

## 2020-01-19 NOTE — PROGRESS NOTE ADULT - SUBJECTIVE AND OBJECTIVE BOX
Patient is a 50 y/o M PMH HTN, HLD, depression p/w LUE weakness/numbness followed by mild L sided chest discomfort/cramping last night ~21:00 w/ associated lightheadedness, SOB, and nausea. Patient was driving at the time, symptoms lasted about 10 minutes and self-resolved. Patient reports recent URI about 2 weeks ago, overall improved. Has never experienced chest pain in the past, had a stress test 1.5 years ago which was normal. Had testing done due to his risk factors, did not have any symptoms at the time. (18 Jan 2020 02:55)      REVIEW OF SYSTEMS  General:	  Skin/Breast:	  Ophthalmologic:  ENMT:	  Respiratory and Thorax:	  Cardiovascular:	  Gastrointestinal:	  Genitourinary:	  Musculoskeletal:	  Neurological:	  Psychiatric:	  Hematology/Lymphatics:	  Endocrine:	  Allergic/Immunologic:	    A 10-system ROS was performed and is negative except for those items noted above and/or in the HPI.    PAST MEDICAL & SURGICAL HISTORY:  MDD (major depressive disorder)  Hyperlipidemia  Essential hypertension  Kidney stone  HTN (hypertension)  No significant past surgical history  H/O lithotripsy  H/O knee surgery  History of appendectomy    FAMILY HISTORY:  FH: myocardial infarction: father, age 53    SOCIAL HISTORY:   T/E/D:   Occupation:   Lives with:     MEDICATIONS (HOME):  Home Medications:  amLODIPine 10 mg oral tablet: 1 tab(s) orally once a day (18 Jan 2020 11:46)    MEDICATIONS  (STANDING):  amLODIPine   Tablet 10 milliGRAM(s) Oral daily  influenza   Vaccine 0.5 milliLiter(s) IntraMuscular once    MEDICATIONS  (PRN):  acetaminophen   Tablet .. 650 milliGRAM(s) Oral every 6 hours PRN Temp greater or equal to 38C (100.4F), Mild Pain (1 - 3), Moderate Pain (4 - 6), Severe Pain (7 - 10)    ALLERGIES/INTOLERANCES:  Allergies  No Known Allergies    Intolerances    VITALS & EXAMINATION:  Vital Signs Last 24 Hrs  T(C): 36.8  HR: 80  BP: 138/66  RR: 18  Neurological Exam    Mental Status:  alert and oriented x3, fluent speech, following commands, repetition and naming intact    Cranial Nerves: PERRL, EOMI without nystagmus, visual fields intact, V 1-3 intact, no facial droop, pallate and uvula midline, no dysarthria, head turn strength normal, shoulder shrug strength normal.    Motor:   Tone:   normal                 Strength:    Upper Extremity    De L 5/5 R 5/5  Bi L 5/5 R 5/5  Tr L 5/5 R 5/5  Br L 5/5 R 5/5    Lower extremity    HF L 5/5 R 5/5  KE L 5/5 R 5/5  KF L 5/5 R 5/5  DF L 5/5 R 5/5  PF L 5/5 R 5/5    Pronator drift: none            Sensation: intact grossly to light touch    Tremor: none appreciated at rest or in action    Deep Tendon Reflexes: 2+ throughout except 1+ RLE     LABORATORY:  CBC                       14.1   7.78  )-----------( 266      ( 18 Jan 2020 00:35 )             46.3     Chem 01-18    142  |  106  |  13  ----------------------------<  110<H>  3.9   |  25  |  0.77    Ca    9.3      18 Jan 2020 00:25    TPro  7.0  /  Alb  4.1  /  TBili  0.2  /  DBili  x   /  AST  40  /  ALT  69<H>  /  AlkPhos  68  01-18    LFTs LIVER FUNCTIONS - ( 18 Jan 2020 00:25 )  Alb: 4.1 g/dL / Pro: 7.0 g/dL / ALK PHOS: 68 u/L / ALT: 69 u/L / AST: 40 u/L / GGT: x           Coagulopathy PT/INR - ( 18 Jan 2020 00:35 )   PT: 13.2 SEC;   INR: 1.18          PTT - ( 18 Jan 2020 00:35 )  PTT:27.6 SEC  Lipid Panel   A1c   Cardiac enzymes     U/A   CSF  Immunological  Other    STUDIES & IMAGING:  Studies (EKG, EEG, EMG, etc):     Radiology (XR, CT, MR, U/S, TTE/NORA):        Assessment and Recommendation:   · Assessment		  Patient is a 50 y/o M PMH HTN, HLD, depression p/w LUE weakness/numbness followed by mild L sided chest discomfort/cramping last night ~21:00 w/ associated lightheadedness, SOB, and nausea. Patient was driving at the time, symptoms lasted about 10 minutes and self-resolved. Patient reports recent URI about 2 weeks ago, overall improved. Has never experienced chest pain in the past, had a stress test 1.5 years ago which was normal. Had testing done due to his risk factors, did not have any symptoms at the time.  Neurology consulted for left hand numbness and headache.  Hand sxs resolved and headache tx successfully w caffeine.      Impression:  Transient LUE numbness and perceived weakness lasting 15 minutes prior to exam.  Can't rule out TIA.    [] HTN control  [] weight loss counseling and vascular risk factor education    Imaging   [] CT head  [] CTA H&N  [] MRI head non contrast    Meds - iso cardiac recs   [] start ASA 81 mg daily  [] start Plavix 75 mg daily  [] start atorva 80 mg daily, titrate based on lipid profile    Studies  [] TTE with bubble    Labs   [] CBC, BMP  [] Lipid panel  [] HbA1C                 Electronic Signatures:  Schoenberg, Laura Gray (MD) 19-Jan-2020 08:17 by Schoenberg, Laura Gray (MD)

## 2020-01-19 NOTE — DISCHARGE NOTE PROVIDER - CARE PROVIDER_API CALL
Livier Martinez (NP; RN)  NP in Family Health  [address] [facility], [address]  [place] [address]  Phone: [phone]  Fax: [phone]  Follow Up Time:     Sathish Gomez)  Cardiovascular Disease; Nuclear Cardiology  [address] [place] [address]  Phone: [phone]  Fax: [phone]  Follow Up Time:

## 2020-01-19 NOTE — DISCHARGE NOTE PROVIDER - HOSPITAL COURSE
49 M PMH HTN, HLD, depression p/w chest pain          chest pain    - trop <6 x2    - TTE -----         HTN    - norvasc             LUE weakness/numbness    - MRI brain negative    - CTA H/N: negative     - neuro Dr Schoenberg cs     - ASA, plavix, atorvastatin 49 M PMH HTN, HLD, depression p/w chest pain          Hospital course:        Pt presented with CP, EKG NSR, CE negative. cardiology consulted. Pt ruled out for ACS. TTE ***. NST normal. Pt reported transient LUE weakness/numbness, neurology consutled. MRI brain, CTA H/N negative for CVA or stenosis but clinical presentation possible TIA pt started on asa and palvix. Pt to continue plavix for 3 week, continue asa indefinitely. Recommended for outpatient TTE with bubble and holter monitor. 49 M PMH HTN, HLD, depression p/w chest pain          Hospital course:        Pt presented with CP, EKG NSR, CE negative. cardiology consulted. Pt ruled out for ACS. TTE normal EF with pericardial fat pad. NST normal. Pt reported transient LUE weakness/numbness, neurology consutled. MRI brain, CTA H/N negative for CVA or stenosis but clinical presentation possible TIA pt started on asa and palvix. Pt to continue plavix for 3 week, continue asa indefinitely. Recommended for outpatient TTE with bubble and holter monitor.             As per Dr Gomez pt cleared for discharge on 1/20. Reviewed discharge medications with patient; All new medications requiring new prescription sent to pharmacy of patients choice. Reviewed need for prescription for previous home medication and new prescriptions sent if requested. Patient in agreement and understands.

## 2020-01-19 NOTE — PROGRESS NOTE ADULT - SUBJECTIVE AND OBJECTIVE BOX
Sathish Gomez MD  Interventional Cardiology / Advance Heart Failure and Cardiac Transplant Specialist  [place] Office : [address] [place] [address] [place]. [address]  Tel:   [place] Office : [address] [facility] [place] [address]  Tel: [phone]  Cell : [phone]    Pt is lying in bed comfortable not in distress, no chest pains no SOB no palpitations  	  MEDICATIONS:  amLODIPine   Tablet 5 milliGRAM(s) Oral daily  aspirin enteric coated 81 milliGRAM(s) Oral daily  clopidogrel Tablet 75 milliGRAM(s) Oral daily        acetaminophen   Tablet .. 650 milliGRAM(s) Oral every 6 hours PRN      atorvastatin 80 milliGRAM(s) Oral at bedtime    influenza   Vaccine 0.5 milliLiter(s) IntraMuscular once      PAST MEDICAL/SURGICAL HISTORY  PAST MEDICAL & SURGICAL HISTORY:  MDD (major depressive disorder)  Hyperlipidemia  Essential hypertension  Kidney stone  HTN (hypertension)  No significant past surgical history  H/O lithotripsy  H/O knee surgery  History of appendectomy      SOCIAL HISTORY: Substance Use (street drugs): ( x ) never used  (  ) other:    FAMILY HISTORY:  FH: myocardial infarction: father, age 53      REVIEW OF SYSTEMS:  CONSTITUTIONAL: No fever, weight loss, or fatigue  EYES: No eye pain, visual disturbances, or discharge  ENMT:  No difficulty hearing, tinnitus, vertigo; No sinus or throat pain  BREASTS: No pain, masses, or nipple discharge  GASTROINTESTINAL: No abdominal or epigastric pain. No nausea, vomiting, or hematemesis; No diarrhea or constipation. No melena or hematochezia.  GENITOURINARY: No dysuria, frequency, hematuria, or incontinence  NEUROLOGICAL: No headaches, memory loss, loss of strength, numbness, or tremors  ENDOCRINE: No heat or cold intolerance; No hair loss  MUSCULOSKELETAL: No joint pain or swelling; No muscle, back, or extremity pain  PSYCHIATRIC: No depression, anxiety, mood swings, or difficulty sleeping  HEME/LYMPH: No easy bruising, or bleeding gums  All others negative    PHYSICAL EXAM:  T(C): 37.1 (01-19-20 @ 13:14), Max: 37.1 (01-19-20 @ 13:14)  HR: 84 (01-19-20 @ 13:14) (73 - 91)  BP: 144/85 (01-19-20 @ 13:14) (126/87 - 144/85)  RR: 14 (01-19-20 @ 13:14) (14 - 17)  SpO2: 100% (01-19-20 @ 13:14) (95% - 100%)  Wt(kg): --  I&O's Summary        GENERAL: NAD  EYES: EOMI, PERRLA, conjunctiva and sclera clear  ENMT: No tonsillar erythema, exudates, or enlargement; Moist mucous membranes, Good dentition, No lesions  Cardiovascular: Normal S1 S2, No JVD, No murmurs, No edema  Respiratory: Lungs clear to auscultation	  Gastrointestinal:  Soft, Non-tender, + BS	  Extremities: Normal range of motion, No clubbing, cyanosis or edema  LYMPH: No lymphadenopathy noted  NERVOUS SYSTEM:  Alert & Oriented X3, Good concentration; Motor Strength 5/5 B/L upper and lower extremities; DTRs 2+ intact and symmetric                                    14.6   7.07  )-----------( 253      ( 19 Jan 2020 05:20 )             46.2     01-19    136  |  103  |  12  ----------------------------<  116<H>  3.8   |  24  |  0.66    Ca    9.1      19 Jan 2020 05:20  Phos  3.5     01-19  Mg     1.9     01-19    TPro  7.0  /  Alb  4.1  /  TBili  0.2  /  DBili  x   /  AST  40  /  ALT  69<H>  /  AlkPhos  68  01-18    proBNP:   Lipid Profile:   HgA1c: Hemoglobin A1C, Whole Blood: 6.4 % (01-19 @ 05:20)    TSH:     Consultant(s) Notes Reviewed:  [x ] YES  [ ] NO    Care Discussed with Consultants/Other Providers [ x] YES  [ ] NO    Imaging Personally Reviewed independently:  [x] YES  [ ] NO    All labs, radiologic studies, vitals, orders and medications list reviewed. Patient is seen and examined at bedside. Case discussed with medical team.

## 2020-01-19 NOTE — PROGRESS NOTE ADULT - ASSESSMENT
ekg - nsr     a/p     1) Left hand weakness/numbness -   neurology consult appreciated , MRI and CTA brain ok     2) Chest pains - EKG and troponins negative f/u echo ,  f/u stress test     3) HTN - cont amlodipine

## 2020-01-19 NOTE — DISCHARGE NOTE PROVIDER - NSDCMRMEDTOKEN_GEN_ALL_CORE_FT
amLODIPine 10 mg oral tablet: 1 tab(s) orally once a day  Vicodin 5 mg-300 mg oral tablet: 2 tab(s) orally every 6 hours, As Needed MDD:8 pills amLODIPine 10 mg oral tablet: 1 tab(s) orally once a day  aspirin 81 mg oral delayed release tablet: 1 tab(s) orally once a day  atorvastatin 80 mg oral tablet: 1 tab(s) orally once a day (at bedtime)  clopidogrel 75 mg oral tablet: 1 tab(s) orally once a day  Vicodin 5 mg-300 mg oral tablet: 2 tab(s) orally every 6 hours, As Needed MDD:8 pills

## 2020-01-19 NOTE — PROGRESS NOTE ADULT - SUBJECTIVE AND OBJECTIVE BOX
SUBJECTIVE / OVERNIGHT EVENTS: pt denies chest pain,sob       MEDICATIONS  (STANDING):  amLODIPine   Tablet 5 milliGRAM(s) Oral daily  aspirin enteric coated 81 milliGRAM(s) Oral daily  atorvastatin 80 milliGRAM(s) Oral at bedtime  clopidogrel Tablet 75 milliGRAM(s) Oral daily  influenza   Vaccine 0.5 milliLiter(s) IntraMuscular once    MEDICATIONS  (PRN):  acetaminophen   Tablet .. 650 milliGRAM(s) Oral every 6 hours PRN Temp greater or equal to 38C (100.4F), Mild Pain (1 - 3), Moderate Pain (4 - 6), Severe Pain (7 - 10)    Vital Signs Last 24 Hrs  T(C): 36.6 (19 Jan 2020 21:44), Max: 37.1 (19 Jan 2020 13:14)  T(F): 97.9 (19 Jan 2020 21:44), Max: 98.7 (19 Jan 2020 13:14)  HR: 85 (19 Jan 2020 21:44) (73 - 91)  BP: 133/81 (19 Jan 2020 21:44) (126/87 - 144/85)  BP(mean): --  RR: 16 (19 Jan 2020 21:44) (14 - 17)  SpO2: 96% (19 Jan 2020 21:44) (95% - 100%)    CAPILLARY BLOOD GLUCOSE        I&O's Summary      Constitutional: No fever, fatigue  Skin: No rash.  Eyes: No recent vision problems or eye pain.  ENT: No congestion, ear pain, or sore throat.  Cardiovascular: No chest pain or palpation.  Respiratory: No cough, shortness of breath, congestion, or wheezing.  Gastrointestinal: No abdominal pain, nausea, vomiting, or diarrhea.  Genitourinary: No dysuria.  Musculoskeletal: No joint swelling.  Neurologic: No headache.    PHYSICAL EXAM:  GENERAL: NAD  EYES: EOMI, PERRLA  NECK: Supple, No JVD  CHEST/LUNG: cta campos   HEART:  S1 , S2 +  ABDOMEN: soft, bs+  EXTREMITIES:  no edema  NEUROLOGY:alert awak eoriented       LABS:                        14.6   7.07  )-----------( 253      ( 19 Jan 2020 05:20 )             46.2     01-19    136  |  103  |  12  ----------------------------<  116<H>  3.8   |  24  |  0.66    Ca    9.1      19 Jan 2020 05:20  Phos  3.5     01-19  Mg     1.9     01-19    TPro  7.0  /  Alb  4.1  /  TBili  0.2  /  DBili  x   /  AST  40  /  ALT  69<H>  /  AlkPhos  68  01-18    PT/INR - ( 18 Jan 2020 00:35 )   PT: 13.2 SEC;   INR: 1.18          PTT - ( 18 Jan 2020 00:35 )  PTT:27.6 SEC          RADIOLOGY & ADDITIONAL TESTS:    Imaging Personally Reviewed:    Consultant(s) Notes Reviewed:      Care Discussed with Consultants/Other Providers:

## 2020-01-19 NOTE — DISCHARGE NOTE PROVIDER - NSDCCPCAREPLAN_GEN_ALL_CORE_FT
PRINCIPAL DISCHARGE DIAGNOSIS  Diagnosis: Angina pectoris  Assessment and Plan of Treatment: You had a stress test done and it was ----      SECONDARY DISCHARGE DIAGNOSES  Diagnosis: Essential hypertension  Assessment and Plan of Treatment: Continue norvasc PRINCIPAL DISCHARGE DIAGNOSIS  Diagnosis: Angina pectoris  Assessment and Plan of Treatment: you cardiac workup echo and stress is ****      SECONDARY DISCHARGE DIAGNOSES  Diagnosis: Transient ischemic attack (TIA)  Assessment and Plan of Treatment: continue aspirin indefinately, plavix for 3 weeks. followup in neurology clinic with JUAN Martinez in 1-2 week.    Diagnosis: Essential hypertension  Assessment and Plan of Treatment: Low sodium and fat diet, continue anti-hypertensive medications, and follow up with primary care physician. PRINCIPAL DISCHARGE DIAGNOSIS  Diagnosis: Angina pectoris  Assessment and Plan of Treatment: you cardiac workup echo has normal EF, pericardial fat pad. stress is normal. followup with Dr Gomez in 1-2 weeks      SECONDARY DISCHARGE DIAGNOSES  Diagnosis: Prediabetes  Assessment and Plan of Treatment: A1C 6.4, diet control. followup with your PMD    Diagnosis: Transient ischemic attack (TIA)  Assessment and Plan of Treatment: continue aspirin indefinately, plavix for 3 weeks. followup in neurology clinic with JUAN Martinez in 1-2 week. you would need outpatient holter monitor and repeat echo with bubble study.    Diagnosis: Essential hypertension  Assessment and Plan of Treatment: Low sodium and fat diet, continue anti-hypertensive medications, and follow up with primary care physician.

## 2020-01-20 ENCOUNTER — TRANSCRIPTION ENCOUNTER (OUTPATIENT)
Age: 50
End: 2020-01-20

## 2020-01-20 VITALS
HEART RATE: 88 BPM | SYSTOLIC BLOOD PRESSURE: 143 MMHG | DIASTOLIC BLOOD PRESSURE: 93 MMHG | OXYGEN SATURATION: 100 % | RESPIRATION RATE: 15 BRPM | TEMPERATURE: 98 F

## 2020-01-20 LAB
ANION GAP SERPL CALC-SCNC: 13 MMO/L — SIGNIFICANT CHANGE UP (ref 7–14)
BASOPHILS # BLD AUTO: 0.05 K/UL — SIGNIFICANT CHANGE UP (ref 0–0.2)
BASOPHILS NFR BLD AUTO: 0.7 % — SIGNIFICANT CHANGE UP (ref 0–2)
BUN SERPL-MCNC: 13 MG/DL — SIGNIFICANT CHANGE UP (ref 7–23)
CALCIUM SERPL-MCNC: 9.5 MG/DL — SIGNIFICANT CHANGE UP (ref 8.4–10.5)
CHLORIDE SERPL-SCNC: 100 MMOL/L — SIGNIFICANT CHANGE UP (ref 98–107)
CO2 SERPL-SCNC: 22 MMOL/L — SIGNIFICANT CHANGE UP (ref 22–31)
CREAT SERPL-MCNC: 0.73 MG/DL — SIGNIFICANT CHANGE UP (ref 0.5–1.3)
EOSINOPHIL # BLD AUTO: 0.14 K/UL — SIGNIFICANT CHANGE UP (ref 0–0.5)
EOSINOPHIL NFR BLD AUTO: 1.8 % — SIGNIFICANT CHANGE UP (ref 0–6)
GLUCOSE SERPL-MCNC: 143 MG/DL — HIGH (ref 70–99)
HCT VFR BLD CALC: 50.3 % — HIGH (ref 39–50)
HGB BLD-MCNC: 15.7 G/DL — SIGNIFICANT CHANGE UP (ref 13–17)
IMM GRANULOCYTES NFR BLD AUTO: 0.8 % — SIGNIFICANT CHANGE UP (ref 0–1.5)
LYMPHOCYTES # BLD AUTO: 1.87 K/UL — SIGNIFICANT CHANGE UP (ref 1–3.3)
LYMPHOCYTES # BLD AUTO: 24.5 % — SIGNIFICANT CHANGE UP (ref 13–44)
MAGNESIUM SERPL-MCNC: 2 MG/DL — SIGNIFICANT CHANGE UP (ref 1.6–2.6)
MCHC RBC-ENTMCNC: 26.9 PG — LOW (ref 27–34)
MCHC RBC-ENTMCNC: 31.2 % — LOW (ref 32–36)
MCV RBC AUTO: 86.3 FL — SIGNIFICANT CHANGE UP (ref 80–100)
MONOCYTES # BLD AUTO: 0.53 K/UL — SIGNIFICANT CHANGE UP (ref 0–0.9)
MONOCYTES NFR BLD AUTO: 6.9 % — SIGNIFICANT CHANGE UP (ref 2–14)
NEUTROPHILS # BLD AUTO: 4.98 K/UL — SIGNIFICANT CHANGE UP (ref 1.8–7.4)
NEUTROPHILS NFR BLD AUTO: 65.3 % — SIGNIFICANT CHANGE UP (ref 43–77)
NRBC # FLD: 0 K/UL — SIGNIFICANT CHANGE UP (ref 0–0)
PLATELET # BLD AUTO: 290 K/UL — SIGNIFICANT CHANGE UP (ref 150–400)
PMV BLD: 10.7 FL — SIGNIFICANT CHANGE UP (ref 7–13)
POTASSIUM SERPL-MCNC: 4.1 MMOL/L — SIGNIFICANT CHANGE UP (ref 3.5–5.3)
POTASSIUM SERPL-SCNC: 4.1 MMOL/L — SIGNIFICANT CHANGE UP (ref 3.5–5.3)
RBC # BLD: 5.83 M/UL — HIGH (ref 4.2–5.8)
RBC # FLD: 13.9 % — SIGNIFICANT CHANGE UP (ref 10.3–14.5)
SODIUM SERPL-SCNC: 135 MMOL/L — SIGNIFICANT CHANGE UP (ref 135–145)
WBC # BLD: 7.63 K/UL — SIGNIFICANT CHANGE UP (ref 3.8–10.5)
WBC # FLD AUTO: 7.63 K/UL — SIGNIFICANT CHANGE UP (ref 3.8–10.5)

## 2020-01-20 PROCEDURE — 93306 TTE W/DOPPLER COMPLETE: CPT | Mod: 26

## 2020-01-20 PROCEDURE — 99238 HOSP IP/OBS DSCHRG MGMT 30/<: CPT

## 2020-01-20 PROCEDURE — 93016 CV STRESS TEST SUPVJ ONLY: CPT | Mod: GC

## 2020-01-20 PROCEDURE — 93018 CV STRESS TEST I&R ONLY: CPT | Mod: GC

## 2020-01-20 PROCEDURE — 78452 HT MUSCLE IMAGE SPECT MULT: CPT | Mod: 26

## 2020-01-20 RX ORDER — ATORVASTATIN CALCIUM 80 MG/1
1 TABLET, FILM COATED ORAL
Qty: 30 | Refills: 0
Start: 2020-01-20 | End: 2020-02-18

## 2020-01-20 RX ORDER — CLOPIDOGREL BISULFATE 75 MG/1
1 TABLET, FILM COATED ORAL
Qty: 21 | Refills: 0
Start: 2020-01-20 | End: 2020-02-09

## 2020-01-20 RX ORDER — ASPIRIN/CALCIUM CARB/MAGNESIUM 324 MG
1 TABLET ORAL
Qty: 30 | Refills: 0
Start: 2020-01-20 | End: 2020-02-18

## 2020-01-20 RX ADMIN — Medication 650 MILLIGRAM(S): at 07:10

## 2020-01-20 RX ADMIN — Medication 81 MILLIGRAM(S): at 12:56

## 2020-01-20 RX ADMIN — AMLODIPINE BESYLATE 5 MILLIGRAM(S): 2.5 TABLET ORAL at 06:36

## 2020-01-20 RX ADMIN — Medication 650 MILLIGRAM(S): at 06:41

## 2020-01-20 RX ADMIN — CLOPIDOGREL BISULFATE 75 MILLIGRAM(S): 75 TABLET, FILM COATED ORAL at 12:56

## 2020-01-20 NOTE — PROGRESS NOTE ADULT - SUBJECTIVE AND OBJECTIVE BOX
SUBJECTIVE / OVERNIGHT EVENTS: pt denies chest pain,sob     MEDICATIONS  (STANDING):  amLODIPine   Tablet 5 milliGRAM(s) Oral daily  aspirin enteric coated 81 milliGRAM(s) Oral daily  atorvastatin 80 milliGRAM(s) Oral at bedtime  clopidogrel Tablet 75 milliGRAM(s) Oral daily  influenza   Vaccine 0.5 milliLiter(s) IntraMuscular once    MEDICATIONS  (PRN):  acetaminophen   Tablet .. 650 milliGRAM(s) Oral every 6 hours PRN Temp greater or equal to 38C (100.4F), Mild Pain (1 - 3), Moderate Pain (4 - 6), Severe Pain (7 - 10)    Vital Signs Last 24 Hrs  T(C): 36.7 (20 Jan 2020 12:36), Max: 36.9 (20 Jan 2020 06:32)  T(F): 98 (20 Jan 2020 12:36), Max: 98.4 (20 Jan 2020 06:32)  HR: 88 (20 Jan 2020 12:36) (85 - 88)  BP: 143/93 (20 Jan 2020 12:36) (135/93 - 143/93)  BP(mean): --  RR: 15 (20 Jan 2020 12:36) (15 - 16)  SpO2: 100% (20 Jan 2020 12:36) (94% - 100%)    Constitutional: No fever, fatigue  Skin: No rash.  Eyes: No recent vision problems or eye pain.  ENT: No congestion, ear pain, or sore throat.  Cardiovascular: No chest pain or palpation.  Respiratory: No cough, shortness of breath, congestion, or wheezing.  Gastrointestinal: No abdominal pain, nausea, vomiting, or diarrhea.  Genitourinary: No dysuria.  Musculoskeletal: No joint swelling.  Neurologic: No headache.    PHYSICAL EXAM:  GENERAL: NAD  EYES: EOMI, PERRLA  NECK: Supple, No JVD  CHEST/LUNG: cta campos   HEART:  S1 , S2 +  ABDOMEN: soft, bs+  EXTREMITIES:  no edema  NEUROLOGY:alert awak eoriented     LABS:  01-20    135  |  100  |  13  ----------------------------<  143<H>  4.1   |  22  |  0.73    Ca    9.5      20 Jan 2020 10:50  Phos  3.5     01-19  Mg     2.0     01-20      Creatinine Trend: 0.73 <--, 0.66 <--, 0.77 <--                        15.7   7.63  )-----------( 290      ( 20 Jan 2020 10:50 )             50.3     Urine Studies:                  Imaging Personally Reviewed:    Consultant(s) Notes Reviewed:      Care Discussed with Consultants/Other Providers:

## 2020-01-20 NOTE — PROGRESS NOTE ADULT - SUBJECTIVE AND OBJECTIVE BOX
Sathish Gomez MD  Interventional Cardiology / Advance Heart Failure and Cardiac Transplant Specialist  [place] Office : [address] [place] [address] [place]. [address]  Tel:   [place] Office : [address] [facility] [place] [address]  Tel: [phone]  Cell : [phone]    Pt is lying in bed comfortable not in distress, no chest pains no SOB no palpitations  	  MEDICATIONS:  amLODIPine   Tablet 5 milliGRAM(s) Oral daily  aspirin enteric coated 81 milliGRAM(s) Oral daily  clopidogrel Tablet 75 milliGRAM(s) Oral daily        acetaminophen   Tablet .. 650 milliGRAM(s) Oral every 6 hours PRN      atorvastatin 80 milliGRAM(s) Oral at bedtime    influenza   Vaccine 0.5 milliLiter(s) IntraMuscular once      PAST MEDICAL/SURGICAL HISTORY  PAST MEDICAL & SURGICAL HISTORY:  MDD (major depressive disorder)  Hyperlipidemia  Essential hypertension  Kidney stone  HTN (hypertension)  No significant past surgical history  H/O lithotripsy  H/O knee surgery  History of appendectomy      SOCIAL HISTORY: Substance Use (street drugs): ( x ) never used  (  ) other:    FAMILY HISTORY:  FH: myocardial infarction: father, age 53      REVIEW OF SYSTEMS:  CONSTITUTIONAL: No fever, weight loss, or fatigue  EYES: No eye pain, visual disturbances, or discharge  ENMT:  No difficulty hearing, tinnitus, vertigo; No sinus or throat pain  BREASTS: No pain, masses, or nipple discharge  GASTROINTESTINAL: No abdominal or epigastric pain. No nausea, vomiting, or hematemesis; No diarrhea or constipation. No melena or hematochezia.  GENITOURINARY: No dysuria, frequency, hematuria, or incontinence  NEUROLOGICAL: No headaches, memory loss, loss of strength, numbness, or tremors  ENDOCRINE: No heat or cold intolerance; No hair loss  MUSCULOSKELETAL: No joint pain or swelling; No muscle, back, or extremity pain  PSYCHIATRIC: No depression, anxiety, mood swings, or difficulty sleeping  HEME/LYMPH: No easy bruising, or bleeding gums  All others negative    PHYSICAL EXAM:  T(C): 36.9 (01-20-20 @ 06:32), Max: 37.1 (01-19-20 @ 13:14)  HR: 85 (01-20-20 @ 06:32) (84 - 85)  BP: 135/93 (01-20-20 @ 06:32) (133/81 - 144/85)  RR: 16 (01-20-20 @ 06:32) (14 - 16)  SpO2: 94% (01-20-20 @ 06:32) (94% - 100%)  Wt(kg): --  I&O's Summary        GENERAL: NAD  EYES: EOMI, PERRLA, conjunctiva and sclera clear  ENMT: No tonsillar erythema, exudates, or enlargement; Moist mucous membranes, Good dentition, No lesions  Cardiovascular: Normal S1 S2, No JVD, No murmurs, No edema  Respiratory: Lungs clear to auscultation	  Gastrointestinal:  Soft, Non-tender, + BS	  Extremities: Normal range of motion, No clubbing, cyanosis or edema  LYMPH: No lymphadenopathy noted  NERVOUS SYSTEM:  Alert & Oriented X3, Good concentration; Motor Strength 5/5 B/L upper and lower extremities; DTRs 2+ intact and symmetric                                    15.7   7.63  )-----------( 290      ( 20 Jan 2020 10:50 )             50.3     01-20    135  |  100  |  13  ----------------------------<  143<H>  4.1   |  22  |  0.73    Ca    9.5      20 Jan 2020 10:50  Phos  3.5     01-19  Mg     2.0     01-20      proBNP:   Lipid Profile:   HgA1c:   TSH:     Consultant(s) Notes Reviewed:  [x ] YES  [ ] NO    Care Discussed with Consultants/Other Providers [ x] YES  [ ] NO    Imaging Personally Reviewed independently:  [x] YES  [ ] NO    All labs, radiologic studies, vitals, orders and medications list reviewed. Patient is seen and examined at bedside. Case discussed with medical team.

## 2020-01-20 NOTE — PROGRESS NOTE ADULT - PROBLEM SELECTOR PLAN 1
echo   stress test as per cards  hba1c elevated - endo eval / nutrition eval- pt want to follow up with endo as out pt   request to be dced after stress/ echo if negative

## 2020-01-20 NOTE — DISCHARGE NOTE NURSING/CASE MANAGEMENT/SOCIAL WORK - PATIENT PORTAL LINK FT
You can access the FollowMyHealth Patient Portal offered by [facility] by registering at the following website: [email or web address]. By joining [facility]’s FollowMyHealth portal, you will also be able to view your health information using other applications (apps) compatible with our system.

## 2020-01-20 NOTE — PROGRESS NOTE ADULT - SUBJECTIVE AND OBJECTIVE BOX
Patient is a 50 y/o M PMH HTN, HLD, depression p/w LUE weakness/numbness followed by mild L sided chest discomfort/cramping last night ~21:00 w/ associated lightheadedness, SOB, and nausea. Patient was driving at the time, symptoms lasted about 10 minutes and self-resolved. Patient reports recent URI about 2 weeks ago, overall improved. Has never experienced chest pain in the past, had a stress test 1.5 years ago which was normal. Had testing done due to his risk factors, did not have any symptoms at the time. (18 Jan 2020 02:55)      REVIEW OF SYSTEMS  General:	  Skin/Breast:	  Ophthalmologic:  ENMT:	  Respiratory and Thorax:	  Cardiovascular:	  Gastrointestinal:	  Genitourinary:	  Musculoskeletal:	  Neurological:	  Psychiatric:	  Hematology/Lymphatics:	  Endocrine:	  Allergic/Immunologic:	    A 10-system ROS was performed and is negative except for those items noted above and/or in the HPI.    PAST MEDICAL & SURGICAL HISTORY:  MDD (major depressive disorder)  Hyperlipidemia  Essential hypertension  Kidney stone  HTN (hypertension)  No significant past surgical history  H/O lithotripsy  H/O knee surgery  History of appendectomy    FAMILY HISTORY:  FH: myocardial infarction: father, age 53    SOCIAL HISTORY:   T/E/D:   Occupation:   Lives with:     MEDICATIONS (HOME):  Home Medications:  amLODIPine 10 mg oral tablet: 1 tab(s) orally once a day (18 Jan 2020 11:46)    MEDICATIONS  (STANDING):  amLODIPine   Tablet 10 milliGRAM(s) Oral daily  influenza   Vaccine 0.5 milliLiter(s) IntraMuscular once    MEDICATIONS  (PRN):  acetaminophen   Tablet .. 650 milliGRAM(s) Oral every 6 hours PRN Temp greater or equal to 38C (100.4F), Mild Pain (1 - 3), Moderate Pain (4 - 6), Severe Pain (7 - 10)    ALLERGIES/INTOLERANCES:  Allergies  No Known Allergies    Intolerances    VITALS & EXAMINATION:  Vital Signs Last 24 Hrs  T(C): 36.8  HR: 80  BP: 138/66  RR: 18  Neurological Exam    Mental Status:  alert and oriented x3, fluent speech, following commands, repetition and naming intact    Cranial Nerves: PERRL, EOMI without nystagmus, visual fields intact, V 1-3 intact, no facial droop, pallate and uvula midline, no dysarthria, head turn strength normal, shoulder shrug strength normal.    Motor:   Tone:   normal                 Strength:    Upper Extremity    De L 5/5 R 5/5  Bi L 5/5 R 5/5  Tr L 5/5 R 5/5  Br L 5/5 R 5/5    Lower extremity    HF L 5/5 R 5/5  KE L 5/5 R 5/5  KF L 5/5 R 5/5  DF L 5/5 R 5/5  PF L 5/5 R 5/5    Pronator drift: none            Sensation: intact grossly to light touch    Tremor: none appreciated at rest or in action    Deep Tendon Reflexes: 2+ throughout except 1+ RLE     LABORATORY:  CBC                       14.1   7.78  )-----------( 266      ( 18 Jan 2020 00:35 )             46.3     Chem 01-18    142  |  106  |  13  ----------------------------<  110<H>  3.9   |  25  |  0.77    Ca    9.3      18 Jan 2020 00:25    TPro  7.0  /  Alb  4.1  /  TBili  0.2  /  DBili  x   /  AST  40  /  ALT  69<H>  /  AlkPhos  68  01-18    LFTs LIVER FUNCTIONS - ( 18 Jan 2020 00:25 )  Alb: 4.1 g/dL / Pro: 7.0 g/dL / ALK PHOS: 68 u/L / ALT: 69 u/L / AST: 40 u/L / GGT: x           Coagulopathy PT/INR - ( 18 Jan 2020 00:35 )   PT: 13.2 SEC;   INR: 1.18          PTT - ( 18 Jan 2020 00:35 )  PTT:27.6 SEC  Lipid Panel   A1c   Cardiac enzymes     U/A   CSF  Immunological  Other    STUDIES & IMAGING:  Studies (EKG, EEG, EMG, etc):     Radiology (XR, CT, MR, U/S, TTE/NORA):    < from: MR Head No Cont (01.18.20 @ 20:04) >  EXAM:  MR BRAIN        PROCEDURE DATE:  Jan 18 2020         INTERPRETATION:  CLINICAL INDICATION: Arm numbness and tingling.    TECHNIQUE: MRI of the brain was performed without contrast.    COMPARISON:    FINDINGS:   There is no evidence of acute infarct, acute intracranial hemorrhage, mass effect or hydrocephalus. No extra-axial collection. Basal cisterns are patent.     No brain parenchymal signal abnormality.    Ventricles and sulci are age-appropriate in size.    Major intracranial flow-voids are preserved.    The orbits, sellar and suprasellar structures, and craniocervical junction are unremarkable.     Polyps versus retention cysts within maxillary sinuses. Visualized paranasal sinuses and mastoid air cells are otherwise clear.    IMPRESSION:   No acute infarct, acute intracranial hemorrhage, or mass effect.                  ANJALI OLIVER M.D., ATTENDING RADIOLOGIST  This document has b    < end of copied text >      Assessment and Recommendation:   · Assessment		  Patient is a 50 y/o M PMH HTN, HLD, depression p/w LUE weakness/numbness followed by mild L sided chest discomfort/cramping last night ~21:00 w/ associated lightheadedness, SOB, and nausea. Patient was driving at the time, symptoms lasted about 10 minutes and self-resolved. Patient reports recent URI about 2 weeks ago, overall improved. Has never experienced chest pain in the past, had a stress test 1.5 years ago which was normal. Had testing done due to his risk factors, did not have any symptoms at the time.  Neurology consulted for left hand numbness and headache.  Hand sxs resolved and headache tx successfully w caffeine.      Impression:  Transient LUE numbness and perceived weakness lasting 15 minutes prior to exam.  Can't rule out TIA.    [] HTN control  [] weight loss counseling and vascular risk factor education    Meds - iso cardiac recs   [] start ASA 81 mg daily  [] start Plavix 75 mg daily  [] start atorva 80 mg daily, titrate based on lipid profile                     Electronic Signatures:  Schoenberg, Laura Gray (MD)

## 2020-01-20 NOTE — CHART NOTE - NSCHARTNOTEFT_GEN_A_CORE
MRI brain negative for acute infarct. CTA negative.     Impression: Transient LUE numbness likely 2/2 TIA.     Plan:   [] ASA + Plavix for 3 weeks followed by ASA alone as per [facility] protocol (TIA or stroke w/ NIHSS < 3)  [] No further neurological workup   [] Neurology will sign off. Please call prn for questions.   [] F/u w/ Stroke NP in clinic at [address] [place] [address]. Please email [email or web address] w/ basic PHI MRI brain negative for acute infarct. CTA negative.     Impression: Transient LUE numbness likely 2/2 TIA.     Plan:   [] ASA + Plavix for 3 weeks followed by ASA alone as per [facility] protocol (TIA or stroke w/ NIHSS < 3)  [] No further neurological workup   [] Recommend TTE w/ bubble and Holter monitor. Can be done outpatient.   [] Neurology will sign off. Please call prn for questions.   [] F/u w/ Stroke NP in clinic at [address] [place] [address]. Please email [email or web address] w/ basic PHI MRI brain negative for acute infarct. CTA negative.     Impression: Transient LUE numbness likely 2/2 TIA. Mechanism unknown though competing mechanisms include  vs. ESUS.     Plan:   [] ASA + Plavix for 3 weeks followed by ASA alone as per [facility] protocol (TIA or stroke w/ NIHSS < 3)  [] No further neurological workup   [] Recommend TTE w/ bubble and Holter monitor. Can be done outpatient.   [] Neurology will sign off. Please call prn for questions.   [] F/u w/ Stroke NP in clinic at [address] [place] [address]. Please email [email or web address] w/ basic PHI

## 2020-09-24 ENCOUNTER — TRANSCRIPTION ENCOUNTER (OUTPATIENT)
Age: 50
End: 2020-09-24

## 2020-10-05 ENCOUNTER — TRANSCRIPTION ENCOUNTER (OUTPATIENT)
Age: 50
End: 2020-10-05

## 2021-02-27 ENCOUNTER — TRANSCRIPTION ENCOUNTER (OUTPATIENT)
Age: 51
End: 2021-02-27

## 2021-03-11 ENCOUNTER — TRANSCRIPTION ENCOUNTER (OUTPATIENT)
Age: 51
End: 2021-03-11

## 2021-03-15 PROBLEM — Z00.00 ENCOUNTER FOR PREVENTIVE HEALTH EXAMINATION: Status: ACTIVE | Noted: 2021-03-15

## 2021-03-15 PROBLEM — E78.5 HYPERLIPIDEMIA, UNSPECIFIED: Chronic | Status: ACTIVE | Noted: 2020-01-18

## 2021-03-15 PROBLEM — F32.9 MAJOR DEPRESSIVE DISORDER, SINGLE EPISODE, UNSPECIFIED: Chronic | Status: ACTIVE | Noted: 2020-01-18

## 2021-03-15 PROBLEM — I10 ESSENTIAL (PRIMARY) HYPERTENSION: Chronic | Status: ACTIVE | Noted: 2020-01-18

## 2021-03-16 ENCOUNTER — APPOINTMENT (OUTPATIENT)
Dept: PHYSICAL MEDICINE AND REHAB | Facility: CLINIC | Age: 51
End: 2021-03-16

## 2021-05-11 ENCOUNTER — TRANSCRIPTION ENCOUNTER (OUTPATIENT)
Age: 51
End: 2021-05-11

## 2021-11-04 NOTE — ED ADULT NURSE NOTE - PRO INTERPRETER NEED 2
Patient Seen in: [facility]    History   Patient presents with:  Finger Pain    Stated Complaint: inf middle finger lt hand    HPI    78-year-old male presents with chief complaint of redness and swelling of the left third lateral nail fold. daily.   LOSARTAN 100 MG Oral Tab,  TAKE 1 TABLET BY MOUTH EVERY DAY   metFORMIN HCl  MG Oral Tablet 24 Hr,  Take 4 tablets (2,000 mg total) by mouth daily with breakfast.   Glucose Blood (ACCU-CHEK GUIDE) In Vitro Strip,  To monitor blood sugar thre above.    PSFH elements reviewed from today and agreed except as otherwise stated in HPI.     Physical Exam     ED Triage Vitals [11/04/21 0835]   /69   Pulse 67   Resp 18   Temp 98.4 °F (36.9 °C)   Temp src Temporal   SpO2 98 %   O2 Device None (Room was located left middle finger. I obtained verbal consent from the patient to drain the abscess who was informed about the possibility of bleeding, pain and worsening of the condition.   1 cc of 1% lidocaine without epinephrine was utilized for local anes R-0 English

## 2023-01-02 ENCOUNTER — EMERGENCY (EMERGENCY)
Facility: HOSPITAL | Age: 53
LOS: 1 days | Discharge: HOME | End: 2023-01-02
Attending: EMERGENCY MEDICINE | Admitting: EMERGENCY MEDICINE
Payer: MEDICAID

## 2023-01-02 VITALS
WEIGHT: 246.92 LBS | SYSTOLIC BLOOD PRESSURE: 114 MMHG | OXYGEN SATURATION: 96 % | RESPIRATION RATE: 18 BRPM | DIASTOLIC BLOOD PRESSURE: 69 MMHG | HEIGHT: 68 IN | HEART RATE: 95 BPM | TEMPERATURE: 98 F

## 2023-01-02 VITALS
OXYGEN SATURATION: 97 % | RESPIRATION RATE: 17 BRPM | DIASTOLIC BLOOD PRESSURE: 81 MMHG | HEART RATE: 88 BPM | TEMPERATURE: 98 F | SYSTOLIC BLOOD PRESSURE: 123 MMHG

## 2023-01-02 DIAGNOSIS — Z98.890 OTHER SPECIFIED POSTPROCEDURAL STATES: Chronic | ICD-10-CM

## 2023-01-02 LAB
ALBUMIN SERPL ELPH-MCNC: 3.2 G/DL — LOW (ref 3.3–5)
ALP SERPL-CCNC: 74 U/L — SIGNIFICANT CHANGE UP (ref 30–120)
ALT FLD-CCNC: 44 U/L DA — SIGNIFICANT CHANGE UP (ref 10–60)
ANION GAP SERPL CALC-SCNC: 5 MMOL/L — SIGNIFICANT CHANGE UP (ref 5–17)
APPEARANCE UR: CLEAR — SIGNIFICANT CHANGE UP
APTT BLD: 26.8 SEC — LOW (ref 27.5–35.5)
AST SERPL-CCNC: 41 U/L — HIGH (ref 10–40)
BASOPHILS # BLD AUTO: 0.04 K/UL — SIGNIFICANT CHANGE UP (ref 0–0.2)
BASOPHILS NFR BLD AUTO: 0.7 % — SIGNIFICANT CHANGE UP (ref 0–2)
BILIRUB SERPL-MCNC: 0.4 MG/DL — SIGNIFICANT CHANGE UP (ref 0.2–1.2)
BILIRUB UR-MCNC: NEGATIVE — SIGNIFICANT CHANGE UP
BUN SERPL-MCNC: 14 MG/DL — SIGNIFICANT CHANGE UP (ref 7–23)
CALCIUM SERPL-MCNC: 8.5 MG/DL — SIGNIFICANT CHANGE UP (ref 8.4–10.5)
CHLORIDE SERPL-SCNC: 103 MMOL/L — SIGNIFICANT CHANGE UP (ref 96–108)
CO2 SERPL-SCNC: 30 MMOL/L — SIGNIFICANT CHANGE UP (ref 22–31)
COLOR SPEC: YELLOW — SIGNIFICANT CHANGE UP
CREAT SERPL-MCNC: 1.04 MG/DL — SIGNIFICANT CHANGE UP (ref 0.5–1.3)
DIFF PNL FLD: NEGATIVE — SIGNIFICANT CHANGE UP
EGFR: 85 ML/MIN/1.73M2 — SIGNIFICANT CHANGE UP
EOSINOPHIL # BLD AUTO: 0.08 K/UL — SIGNIFICANT CHANGE UP (ref 0–0.5)
EOSINOPHIL NFR BLD AUTO: 1.4 % — SIGNIFICANT CHANGE UP (ref 0–6)
EPI CELLS # UR: SIGNIFICANT CHANGE UP
FLUAV AG NPH QL: SIGNIFICANT CHANGE UP
FLUBV AG NPH QL: SIGNIFICANT CHANGE UP
GLUCOSE SERPL-MCNC: 119 MG/DL — HIGH (ref 70–99)
GLUCOSE UR QL: NEGATIVE MG/DL — SIGNIFICANT CHANGE UP
HCT VFR BLD CALC: 50.7 % — HIGH (ref 39–50)
HGB BLD-MCNC: 15.8 G/DL — SIGNIFICANT CHANGE UP (ref 13–17)
IMM GRANULOCYTES NFR BLD AUTO: 0.7 % — SIGNIFICANT CHANGE UP (ref 0–0.9)
INR BLD: 1.29 RATIO — HIGH (ref 0.88–1.16)
KETONES UR-MCNC: NEGATIVE — SIGNIFICANT CHANGE UP
LEUKOCYTE ESTERASE UR-ACNC: ABNORMAL
LIDOCAIN IGE QN: 131 U/L — SIGNIFICANT CHANGE UP (ref 73–393)
LYMPHOCYTES # BLD AUTO: 1.3 K/UL — SIGNIFICANT CHANGE UP (ref 1–3.3)
LYMPHOCYTES # BLD AUTO: 22.3 % — SIGNIFICANT CHANGE UP (ref 13–44)
MCHC RBC-ENTMCNC: 26.6 PG — LOW (ref 27–34)
MCHC RBC-ENTMCNC: 31.2 GM/DL — LOW (ref 32–36)
MCV RBC AUTO: 85.2 FL — SIGNIFICANT CHANGE UP (ref 80–100)
MONOCYTES # BLD AUTO: 0.8 K/UL — SIGNIFICANT CHANGE UP (ref 0–0.9)
MONOCYTES NFR BLD AUTO: 13.7 % — SIGNIFICANT CHANGE UP (ref 2–14)
NEUTROPHILS # BLD AUTO: 3.56 K/UL — SIGNIFICANT CHANGE UP (ref 1.8–7.4)
NEUTROPHILS NFR BLD AUTO: 61.2 % — SIGNIFICANT CHANGE UP (ref 43–77)
NITRITE UR-MCNC: NEGATIVE — SIGNIFICANT CHANGE UP
NRBC # BLD: 0 /100 WBCS — SIGNIFICANT CHANGE UP (ref 0–0)
PH UR: 5 — SIGNIFICANT CHANGE UP (ref 5–8)
PLATELET # BLD AUTO: 241 K/UL — SIGNIFICANT CHANGE UP (ref 150–400)
POTASSIUM SERPL-MCNC: 4.5 MMOL/L — SIGNIFICANT CHANGE UP (ref 3.5–5.3)
POTASSIUM SERPL-SCNC: 4.5 MMOL/L — SIGNIFICANT CHANGE UP (ref 3.5–5.3)
PROT SERPL-MCNC: 7.6 G/DL — SIGNIFICANT CHANGE UP (ref 6–8.3)
PROT UR-MCNC: 30 MG/DL
PROTHROM AB SERPL-ACNC: 15.3 SEC — HIGH (ref 10.5–13.4)
RBC # BLD: 5.95 M/UL — HIGH (ref 4.2–5.8)
RBC # FLD: 14.6 % — HIGH (ref 10.3–14.5)
RBC CASTS # UR COMP ASSIST: SIGNIFICANT CHANGE UP /HPF (ref 0–4)
RSV RNA NPH QL NAA+NON-PROBE: SIGNIFICANT CHANGE UP
SARS-COV-2 RNA SPEC QL NAA+PROBE: SIGNIFICANT CHANGE UP
SODIUM SERPL-SCNC: 138 MMOL/L — SIGNIFICANT CHANGE UP (ref 135–145)
SP GR SPEC: 1.02 — SIGNIFICANT CHANGE UP (ref 1.01–1.02)
UROBILINOGEN FLD QL: NEGATIVE MG/DL — SIGNIFICANT CHANGE UP
WBC # BLD: 5.82 K/UL — SIGNIFICANT CHANGE UP (ref 3.8–10.5)
WBC # FLD AUTO: 5.82 K/UL — SIGNIFICANT CHANGE UP (ref 3.8–10.5)
WBC UR QL: SIGNIFICANT CHANGE UP

## 2023-01-02 PROCEDURE — 96375 TX/PRO/DX INJ NEW DRUG ADDON: CPT

## 2023-01-02 PROCEDURE — 81001 URINALYSIS AUTO W/SCOPE: CPT

## 2023-01-02 PROCEDURE — 85025 COMPLETE CBC W/AUTO DIFF WBC: CPT

## 2023-01-02 PROCEDURE — 74177 CT ABD & PELVIS W/CONTRAST: CPT | Mod: MA

## 2023-01-02 PROCEDURE — 80053 COMPREHEN METABOLIC PANEL: CPT

## 2023-01-02 PROCEDURE — 85610 PROTHROMBIN TIME: CPT

## 2023-01-02 PROCEDURE — 36415 COLL VENOUS BLD VENIPUNCTURE: CPT

## 2023-01-02 PROCEDURE — 83690 ASSAY OF LIPASE: CPT

## 2023-01-02 PROCEDURE — 85730 THROMBOPLASTIN TIME PARTIAL: CPT

## 2023-01-02 PROCEDURE — 74177 CT ABD & PELVIS W/CONTRAST: CPT | Mod: 26,MA

## 2023-01-02 PROCEDURE — 96374 THER/PROPH/DIAG INJ IV PUSH: CPT | Mod: XU

## 2023-01-02 PROCEDURE — 87637 SARSCOV2&INF A&B&RSV AMP PRB: CPT

## 2023-01-02 PROCEDURE — 99284 EMERGENCY DEPT VISIT MOD MDM: CPT

## 2023-01-02 PROCEDURE — 99285 EMERGENCY DEPT VISIT HI MDM: CPT

## 2023-01-02 RX ORDER — PANTOPRAZOLE SODIUM 20 MG/1
40 TABLET, DELAYED RELEASE ORAL ONCE
Refills: 0 | Status: COMPLETED | OUTPATIENT
Start: 2023-01-02 | End: 2023-01-02

## 2023-01-02 RX ORDER — SODIUM CHLORIDE 9 MG/ML
1000 INJECTION INTRAMUSCULAR; INTRAVENOUS; SUBCUTANEOUS ONCE
Refills: 0 | Status: COMPLETED | OUTPATIENT
Start: 2023-01-02 | End: 2023-01-02

## 2023-01-02 RX ORDER — ONDANSETRON 8 MG/1
4 TABLET, FILM COATED ORAL ONCE
Refills: 0 | Status: COMPLETED | OUTPATIENT
Start: 2023-01-02 | End: 2023-01-02

## 2023-01-02 RX ORDER — ONDANSETRON 8 MG/1
1 TABLET, FILM COATED ORAL
Qty: 15 | Refills: 0
Start: 2023-01-02

## 2023-01-02 RX ADMIN — SODIUM CHLORIDE 1000 MILLILITER(S): 9 INJECTION INTRAMUSCULAR; INTRAVENOUS; SUBCUTANEOUS at 15:12

## 2023-01-02 RX ADMIN — ONDANSETRON 4 MILLIGRAM(S): 8 TABLET, FILM COATED ORAL at 15:34

## 2023-01-02 RX ADMIN — PANTOPRAZOLE SODIUM 40 MILLIGRAM(S): 20 TABLET, DELAYED RELEASE ORAL at 15:12

## 2023-01-02 NOTE — ED PROVIDER NOTE - CLINICAL SUMMARY MEDICAL DECISION MAKING FREE TEXT BOX
52 male with abdominal pain vomiting after taking Bactrim for finger infection.  Likely antibiotic related but will rule out obstruction colitis or other intra-abdominal pathology.  Plan is labs CT abdomen urine and reassess

## 2023-01-02 NOTE — ED ADULT TRIAGE NOTE - CHIEF COMPLAINT QUOTE
"last couple days I've been having a fever, abdominal pain, nausea, vomiting". was recently on Bactrim for a finger tip infection and only took two doses because the nausea and vomiting started 24hours later but finger improved. covid/flu swabs were negative at urgent care today.

## 2023-01-02 NOTE — ED ADULT NURSE NOTE - OBJECTIVE STATEMENT
pt presents to the ED sent from [facility] MD with complaint of  abdominal pain, pt states sent due to rebound tenderness noted in all four quadrants upon examination by MD. pt states pain started Saturday post abx for finger infection. pt states having fever saturday, abdominal pain, frequent soft stools. vss, no acute distress noted.

## 2023-01-02 NOTE — ED PROVIDER NOTE - CARE PROVIDERS DIRECT ADDRESSES
,DirectAddress_Unknown,[email or web address] ,DirectAddress_Unknown,[email or web address],DirectAddress_Unknown

## 2023-01-02 NOTE — ED PROVIDER NOTE - NSFOLLOWUPINSTRUCTIONS_ED_ALL_ED_FT
Gastritis, Adult    Outline of an adult's lower body with a close-up of the stomach, showing inflammation and an ulcer inside the stomach.   Gastritis is irritation and swelling (inflammation) of the stomach. There are two kinds of gastritis:  •Acute gastritis. This kind develops quickly.      •Chronic gastritis. This kind is much more common. It develops slowly and lasts for a long time.      It is important to get help for this condition. If you do not get help, your stomach can bleed, and you can get sores (ulcers) in your stomach.      What are the causes?    This condition may be caused by:  •Germs that get to your stomach and cause an infection.      •Drinking too much alcohol.      •Medicines you are taking.      •Having too much acid in the stomach.      •Having a disease of the stomach.      Other causes may include:  •An allergic reaction.      •Some cancer treatments (radiation).      •Smoking cigarettes or using products that contain nicotine or tobacco.      In some cases, the cause of this condition is not known.      What increases the risk?    •Having a disease of the intestines.      •Having Crohn's disease.      •Using aspirin or ibuprofen and other NSAIDs to treat other conditions.      •Stress.        What are the signs or symptoms?    •Pain in your stomach.      •A burning feeling in your stomach.      •Feeling like you may vomit (nauseous).      •Vomiting or vomiting blood.      •Feeling too full after you eat.      •Weight loss.      •Bad breath.      •Blood in your poop (stool).      In some cases, there are no symptoms.      How is this treated?    This condition is treated with medicines. The medicines that are used depend on what caused the condition. You may be given:  •Antibiotic medicine, if your condition was caused by an infection from germs.      •H2 blockers and similar medicines, if your condition was caused by too much acid in the stomach.      Treatment may also include stopping the use of certain medicines, such as aspirin or ibuprofen.      Follow these instructions at home:    Medicines     •Take over-the-counter and prescription medicines only as told by your doctor.      •If you were prescribed an antibiotic medicine, take it as told by your doctor. Do not stop taking it even if you start to feel better.      Alcohol use   • Do not drink alcohol if:  •Your doctor tells you not to drink.      •You are pregnant, may be pregnant, or are planning to become pregnant.      •If you drink alcohol:•Limit your use to:  •0–1 drink a day for women.      •0–2 drinks a day for men.        •Know how much alcohol is in your drink. In the [place] one drink equals one 12 oz bottle of beer (355 mL), one 5 oz glass of wine (148 mL), or one 1½ oz glass of hard liquor (44 mL).          General instructions   Three cups showing dark yellow, yellow, and pale yellow pee.   •Eat small meals often, instead of large meals.      •Avoid foods and drinks that make you feel worse.      •Drink enough fluid to keep your pee (urine) pale yellow.      •Talk with your doctor about ways to manage stress. You can exercise or do deep breathing, meditation, or yoga.      • Do not smoke or use any products that contain nicotine or tobacco. If you need help quitting, ask your doctor.      •Keep all follow-up visits.        Contact a doctor if:    •Your symptoms get worse.      •Your stomach pain gets worse.      •Your symptoms go away and then come back.      •You have a fever.        Get help right away if:    •You vomit blood or something that looks like coffee grounds.      •You have black or dark red poop.      •You throw up any time you try to drink fluids.      These symptoms may be an emergency. Get help right away. Call your local emergency services (911 in the [place]   • Do not wait to see if the symptoms will go away.       • Do not drive yourself to the hospital.         Summary    •Gastritis is irritation and swelling (inflammation) of the stomach.      •You must get help for this condition. If you do not get help, your stomach can bleed, and you can get sores (ulcers) in your stomach.      •You can be treated with medicines for germs or medicines to block too much acid in your stomach.      This information is not intended to replace advice given to you by your health care provider. Make sure you discuss any questions you have with your health care provider.

## 2023-01-02 NOTE — ED PROVIDER NOTE - PATIENT PORTAL LINK FT
You can access the FollowMyHealth Patient Portal offered by [facility] by registering at the following website: [email or web address]. By joining [facility]’s FollowMyHealth portal, you will also be able to view your health information using other applications (apps) compatible with our system.

## 2023-01-02 NOTE — ED PROVIDER NOTE - PROVIDER TOKENS
PROVIDER:[TOKEN:[[id number]],FOLLOWUP:[1-3 Days],ESTABLISHEDPATIENT:[T]],PROVIDER:[TOKEN:[[id number]],FOLLOWUP:[1-3 Days]] PROVIDER:[TOKEN:[[id number]],FOLLOWUP:[1-3 Days],ESTABLISHEDPATIENT:[T]],PROVIDER:[TOKEN:[[id number]],FOLLOWUP:[1-3 Days]],PROVIDER:[TOKEN:[[id number]],FOLLOWUP:[1-3 Days]]

## 2023-01-02 NOTE — ED ADULT NURSE NOTE - NSICDXPASTMEDICALHX_GEN_ALL_CORE_FT
PAST MEDICAL HISTORY:  Essential hypertension     HTN (hypertension)     Hyperlipidemia     Kidney stone     MDD (major depressive disorder)

## 2023-01-02 NOTE — ED PROVIDER NOTE - CARE PROVIDER_API CALL
Lyndsay Ramachandran (DO)  Family Medicine  [address] [facility] [address] [facility] [facility] [address] [place] [address]  Phone: [phone]  Fax: [phone]  Established Patient  Follow Up Time: 1-3 Days    Elfego Mcgrath)  Gastroenterology  [address] [place] [address]  Phone: [phone]  Fax: [phone]  Follow Up Time: 1-3 Days   Lyndsay Ramachandran (DO)  Family Medicine  [address] [facility], [place] [address]  Phone: [phone]  Fax: [phone]  Established Patient  Follow Up Time: 1-3 Days    Elfego Mcgrath)  Gastroenterology  [address] [place] [address]  Phone: [phone]  Fax: [phone]  Follow Up Time: 1-3 Days    Sergio Rahman)  Surgery  [address] [place] [address], [address]  [place] [address]  Phone: [phone]  Fax: [phone]  Follow Up Time: 1-3 Days

## 2023-01-02 NOTE — ED PROVIDER NOTE - OBJECTIVE STATEMENT
52-year-old male with history of hypertension had a paronychia drained at urgent care several days ago.  Was started on Bactrim.  Took 2 doses of Bactrim and became ill.  Started vomiting and loose stools.  Had vague abdominal pain until this morning.  Went back to urgent care today and was advised to come to the ER for further evaluation.  Denies hematemesis melena dysuria hematuria.  States he had a fever at home.  Never had a colonoscopy.  His PCP is Dr. Richards

## 2023-01-02 NOTE — ED ADULT NURSE NOTE - NSICDXPASTSURGICALHX_GEN_ALL_CORE_FT
PAST SURGICAL HISTORY:  H/O knee surgery     H/O lithotripsy     History of appendectomy     No significant past surgical history

## 2023-01-02 NOTE — ED PROVIDER NOTE - PROGRESS NOTE DETAILS
Pt tolerating PO without difficulty. Reviewed labs and CT findings with pt. Pt does not want to be admitted . Will continue PO fluids, DC antibiotics and FU with GI if symptoms persist. Zofran Rxed.

## 2023-10-04 ENCOUNTER — NON-APPOINTMENT (OUTPATIENT)
Age: 53
End: 2023-10-04

## 2023-10-10 ENCOUNTER — NON-APPOINTMENT (OUTPATIENT)
Age: 53
End: 2023-10-10

## 2023-12-08 ENCOUNTER — NON-APPOINTMENT (OUTPATIENT)
Age: 53
End: 2023-12-08

## 2024-04-08 ENCOUNTER — APPOINTMENT (OUTPATIENT)
Dept: DERMATOLOGY | Facility: CLINIC | Age: 54
End: 2024-04-08
Payer: MEDICAID

## 2024-04-08 DIAGNOSIS — R21 RASH AND OTHER NONSPECIFIC SKIN ERUPTION: ICD-10-CM

## 2024-04-08 DIAGNOSIS — L73.9 FOLLICULAR DISORDER, UNSPECIFIED: ICD-10-CM

## 2024-04-08 DIAGNOSIS — L70.0 ACNE VULGARIS: ICD-10-CM

## 2024-04-08 PROCEDURE — 10040 EXTRACTION: CPT

## 2024-04-08 PROCEDURE — 99204 OFFICE O/P NEW MOD 45 MIN: CPT | Mod: 25

## 2024-04-08 RX ORDER — DOXYCYCLINE HYCLATE 100 MG/1
100 TABLET ORAL DAILY
Qty: 42 | Refills: 2 | Status: ACTIVE | COMMUNITY
Start: 2024-04-08 | End: 1900-01-01

## 2024-04-08 RX ORDER — ATORVASTATIN CALCIUM 80 MG/1
TABLET, FILM COATED ORAL
Refills: 0 | Status: ACTIVE | COMMUNITY

## 2024-04-08 RX ORDER — AMLODIPINE BESYLATE 5 MG/1
TABLET ORAL
Refills: 0 | Status: ACTIVE | COMMUNITY

## 2024-04-08 NOTE — ASSESSMENT
[FreeTextEntry1] : acne, folliculitis Acne surgery was performed with an 11 blade to areas on forehead and cheek; SED retin a 0.025% cream at bedtime; SED doxy 100 mg PO daily x6 weeks; SED  f.u PRN excision EIC on right upper arm in [place]

## 2024-04-08 NOTE — HISTORY OF PRESENT ILLNESS
[FreeTextEntry1] : acne [de-identified] : 54 year old male. acne. was on accutane previously. unable to tolerate.

## 2024-05-13 ENCOUNTER — RX RENEWAL (OUTPATIENT)
Age: 54
End: 2024-05-13

## 2024-05-13 RX ORDER — TRETINOIN 0.25 MG/G
0.03 CREAM TOPICAL
Qty: 45 | Refills: 0 | Status: ACTIVE | COMMUNITY
Start: 2024-04-08 | End: 1900-01-01

## 2024-05-23 ENCOUNTER — APPOINTMENT (OUTPATIENT)
Dept: DERMATOLOGY | Facility: CLINIC | Age: 54
End: 2024-05-23
Payer: MEDICAID

## 2024-05-23 DIAGNOSIS — D48.5 NEOPLASM OF UNCERTAIN BEHAVIOR OF SKIN: ICD-10-CM

## 2024-05-23 PROCEDURE — 12032 INTMD RPR S/A/T/EXT 2.6-7.5: CPT

## 2024-05-23 PROCEDURE — 11403 EXC TR-EXT B9+MARG 2.1-3CM: CPT

## 2024-05-23 NOTE — PROCEDURE
[___ cm] : [unfilled] cm [___ ml of 1% lidocaine w/ 1:100,000 epinephrine] : [unfilled] ml of 1% lidocaine with 1:100,000 epinephrine [Pressure] : pressure [3-0] : 3-0 Nylon [____ cm] : [unfilled] cm [____ days] : [unfilled] days [FreeTextEntry1] : Chicho Duarte [FreeTextEntry7] : right upper arm [TWNoteComboBox1] : Epidermal Inclusion Cyst [TWNoteComboBox4] : Epidermal Inclusion Cyst [TWNoteComboBox3] : Subcutaneous fat [TWNoteComboBox5] : Subcutaneous fat

## 2024-05-29 LAB — DERMATOLOGY BIOPSY: NORMAL

## 2024-06-07 ENCOUNTER — LABORATORY RESULT (OUTPATIENT)
Age: 54
End: 2024-06-07

## 2024-06-07 ENCOUNTER — APPOINTMENT (OUTPATIENT)
Dept: DERMATOLOGY | Facility: CLINIC | Age: 54
End: 2024-06-07
Payer: MEDICAID

## 2024-06-07 DIAGNOSIS — L72.0 EPIDERMAL CYST: ICD-10-CM

## 2024-06-07 DIAGNOSIS — T14.8XXA OTHER INJURY OF UNSPECIFIED BODY REGION, INITIAL ENCOUNTER: ICD-10-CM

## 2024-06-07 PROCEDURE — 99213 OFFICE O/P EST LOW 20 MIN: CPT

## 2024-06-07 RX ORDER — MUPIROCIN 20 MG/G
2 OINTMENT TOPICAL
Qty: 1 | Refills: 0 | Status: ACTIVE | COMMUNITY
Start: 2024-06-07 | End: 1900-01-01

## 2024-06-07 NOTE — PHYSICAL EXAM
[FreeTextEntry3] : Focused physical exam with relevant aspects of exam performed today.  Well-healed scar at the R upper arm without erythema or dehiscence.  Non-tender to palpation. Thin yellow and sanguinous drainage from excision site after suture removal.

## 2024-06-07 NOTE — HISTORY OF PRESENT ILLNESS
[FreeTextEntry1] : f/u: EIC [de-identified] : 54M last seen 2 weeks ago, presenting today for   1. F/u EIC, right upper arm, s/p excision 5/23/2024. Presenting today for suture removal.  Notes a significant amount of drainage from excision site- clear-yellow fluid. Denies pain, pruritus, fever, chills, nausea, vomiting.  Notes that he is still taking doxy 100 mg PO daily as prescribed by Dr. Duarte back at 4/08/2024 appointment for #acne and #folliculitis. Originally was prescribed for only 6 weeks but pharmacy has refilled and Patient is still taking medication.

## 2024-06-07 NOTE — ASSESSMENT
[FreeTextEntry1] : #EIC, right upper arm, s/p excision 5/23/2024 -- Diagnosis reviewed. -- There are no signs or symptoms of infection or dehiscence.  -- Drainage from excision site clinically is not suspicious for infection, however culture taken from exudate today and sent for bacterial culture. Will f/u and treat as indicated. In addition, do not favor infection as Pt is still taking doxy 100 mg PO daily as prescribed by Dr. Duarte back at 4/08/2024 appointment for #acne and #folliculitis. Originally was prescribed for only 6 weeks but pharmacy has refilled, and Patient is still taking medication (see hpi).  -- Out of an abundance of caution, start mupirocin oint TID to affected area until clear.  -- There is no evidence of bleeding or hematoma formation.  -- Sutures were removed.  -- Pathology was reviewed with patient and patient was given wound care instructions.  RTC PRN to f/u with Dr. Duarte.

## 2024-06-09 ENCOUNTER — NON-APPOINTMENT (OUTPATIENT)
Age: 54
End: 2024-06-09

## 2024-06-10 RX ORDER — SULFAMETHOXAZOLE AND TRIMETHOPRIM 800; 160 MG/1; MG/1
800-160 TABLET ORAL TWICE DAILY
Qty: 20 | Refills: 0 | Status: ACTIVE | COMMUNITY
Start: 2024-06-10 | End: 1900-01-01

## 2024-08-07 ENCOUNTER — APPOINTMENT (OUTPATIENT)
Dept: DERMATOLOGY | Facility: CLINIC | Age: 54
End: 2024-08-07

## 2024-08-07 PROBLEM — L72.3 INFLAMED SEBACEOUS CYST: Status: ACTIVE | Noted: 2024-08-07

## 2024-08-07 PROBLEM — L29.9 PRURITUS: Status: ACTIVE | Noted: 2024-08-07

## 2024-08-07 PROCEDURE — 11900 INJECT SKIN LESIONS </W 7: CPT | Mod: 59

## 2024-08-07 PROCEDURE — 99214 OFFICE O/P EST MOD 30 MIN: CPT | Mod: 25

## 2024-08-07 PROCEDURE — 10160 PNXR ASPIR ABSC HMTMA BULLA: CPT | Mod: 59

## 2024-08-07 NOTE — HISTORY OF PRESENT ILLNESS
[FreeTextEntry1] : spot left upper arm, acne [de-identified] : 54 year old male. drainage near area of previous cyst excision. acne.

## 2024-08-07 NOTE — ASSESSMENT
[FreeTextEntry1] : acne  tretinoin at bedtime; SED  inflamed EIC, NUB near excision site recurrent or new cyst aspiration I&D; 0.2 cc drained; SED Risks and benefits were discussed including including atrophy, discoloration Intralesional triamcinolone, concentration  5 mg/cc; Total volume    0.1  cc Sites: 1  if recurrent; excise in 6 weeks (or later on f/u)

## 2024-08-16 ENCOUNTER — RX RENEWAL (OUTPATIENT)
Age: 54
End: 2024-08-16

## 2024-11-07 ENCOUNTER — RX RENEWAL (OUTPATIENT)
Age: 54
End: 2024-11-07

## 2024-12-10 ENCOUNTER — RX RENEWAL (OUTPATIENT)
Age: 54
End: 2024-12-10

## 2025-03-15 ENCOUNTER — NON-APPOINTMENT (OUTPATIENT)
Age: 55
End: 2025-03-15

## 2025-06-08 ENCOUNTER — NON-APPOINTMENT (OUTPATIENT)
Age: 55
End: 2025-06-08

## 2025-06-16 ENCOUNTER — APPOINTMENT (OUTPATIENT)
Dept: DERMATOLOGY | Facility: CLINIC | Age: 55
End: 2025-06-16
Payer: MEDICAID

## 2025-06-16 ENCOUNTER — NON-APPOINTMENT (OUTPATIENT)
Age: 55
End: 2025-06-16

## 2025-06-16 VITALS — WEIGHT: 220 LBS | HEIGHT: 69 IN | BODY MASS INDEX: 32.58 KG/M2

## 2025-06-16 PROBLEM — L65.9 HAIR LOSS: Status: ACTIVE | Noted: 2025-06-16

## 2025-06-16 PROCEDURE — 99214 OFFICE O/P EST MOD 30 MIN: CPT

## 2025-06-16 RX ORDER — MINOXIDIL 2.5 MG/1
2.5 TABLET ORAL DAILY
Qty: 90 | Refills: 3 | Status: ACTIVE | COMMUNITY
Start: 2025-06-16 | End: 1900-01-01

## 2025-06-23 ENCOUNTER — RX RENEWAL (OUTPATIENT)
Age: 55
End: 2025-06-23

## 2025-09-04 ENCOUNTER — RX RENEWAL (OUTPATIENT)
Age: 55
End: 2025-09-04